# Patient Record
Sex: FEMALE | Race: WHITE | NOT HISPANIC OR LATINO | Employment: OTHER | URBAN - METROPOLITAN AREA
[De-identification: names, ages, dates, MRNs, and addresses within clinical notes are randomized per-mention and may not be internally consistent; named-entity substitution may affect disease eponyms.]

---

## 2017-03-29 ENCOUNTER — TELEPHONE (OUTPATIENT)
Dept: INTERNAL MEDICINE | Facility: CLINIC | Age: 52
End: 2017-03-29

## 2017-03-29 DIAGNOSIS — Z12.31 ENCOUNTER FOR SCREENING MAMMOGRAM FOR MALIGNANT NEOPLASM OF BREAST: Primary | ICD-10-CM

## 2017-03-29 NOTE — TELEPHONE ENCOUNTER
----- Message from Anni Armenta sent at 3/29/2017 10:30 AM CDT -----  Contact: Self/[phone]  Pt is requesting order be put in for jasvir.Please advise.

## 2017-09-17 ENCOUNTER — OFFICE VISIT (OUTPATIENT)
Dept: URGENT CARE | Facility: CLINIC | Age: 52
End: 2017-09-17
Payer: COMMERCIAL

## 2017-09-17 VITALS
DIASTOLIC BLOOD PRESSURE: 77 MMHG | WEIGHT: 125 LBS | HEART RATE: 73 BPM | HEIGHT: 66 IN | RESPIRATION RATE: 16 BRPM | SYSTOLIC BLOOD PRESSURE: 127 MMHG | TEMPERATURE: 98 F | OXYGEN SATURATION: 100 % | BODY MASS INDEX: 20.09 KG/M2

## 2017-09-17 DIAGNOSIS — S76.212A GROIN STRAIN, LEFT, INITIAL ENCOUNTER: Primary | ICD-10-CM

## 2017-09-17 PROCEDURE — 3008F BODY MASS INDEX DOCD: CPT | Mod: S$GLB,,, | Performed by: PHYSICIAN ASSISTANT

## 2017-09-17 PROCEDURE — 99203 OFFICE O/P NEW LOW 30 MIN: CPT | Mod: S$GLB,,, | Performed by: PHYSICIAN ASSISTANT

## 2017-09-17 RX ORDER — LORAZEPAM 1 MG/1
TABLET ORAL
COMMUNITY
Start: 2017-06-29 | End: 2017-09-27 | Stop reason: SDUPTHER

## 2017-09-17 RX ORDER — NAPROXEN SODIUM 220 MG/1
81 TABLET, FILM COATED ORAL
COMMUNITY
End: 2017-09-27

## 2017-09-17 RX ORDER — LORAZEPAM 0.5 MG/1
TABLET ORAL
COMMUNITY
End: 2017-09-27 | Stop reason: SDUPTHER

## 2017-09-17 RX ORDER — OMEGA-3 FATTY ACIDS 1000 MG
CAPSULE ORAL
COMMUNITY
End: 2018-01-25

## 2017-09-17 RX ORDER — ESTRADIOL 0.1 MG/G
CREAM VAGINAL
COMMUNITY
Start: 2017-07-06 | End: 2017-09-27 | Stop reason: SDUPTHER

## 2017-09-17 NOTE — PROGRESS NOTES
Subjective:       Patient ID: Willa Banegas is a 51 y.o. female.    Chief Complaint: Groin Pain    Pt states intermittent left groin pain x apprx 1 week after a pilates class.      Groin Pain   This is a new problem. The current episode started in the past 7 days. The problem occurs intermittently. The problem has been unchanged. The problem affects the left side. Pertinent negatives include no abdominal pain, back pain, chills, diarrhea, fever, headaches, joint pain, nausea, rash, sore throat or vomiting. The symptoms are aggravated by activity.     Review of Systems   Constitution: Negative for chills and fever.   HENT: Negative for sore throat.    Eyes: Negative for blurred vision.   Cardiovascular: Negative for chest pain.   Respiratory: Negative for shortness of breath.    Skin: Negative for rash.   Musculoskeletal: Negative for back pain and joint pain.   Gastrointestinal: Negative for abdominal pain, diarrhea, nausea and vomiting.   Neurological: Negative for headaches.   Psychiatric/Behavioral: The patient is not nervous/anxious.        Objective:      Physical Exam   Constitutional: She is oriented to person, place, and time. She appears well-developed and well-nourished. She is cooperative.  Non-toxic appearance. She does not appear ill. No distress.   HENT:   Head: Normocephalic and atraumatic.   Right Ear: Hearing, tympanic membrane, external ear and ear canal normal.   Left Ear: Hearing, tympanic membrane, external ear and ear canal normal.   Nose: Nose normal. No mucosal edema, rhinorrhea or nasal deformity. No epistaxis. Right sinus exhibits no maxillary sinus tenderness and no frontal sinus tenderness. Left sinus exhibits no maxillary sinus tenderness and no frontal sinus tenderness.   Mouth/Throat: Uvula is midline, oropharynx is clear and moist and mucous membranes are normal. No trismus in the jaw. Normal dentition. No uvula swelling. No posterior oropharyngeal erythema.   Eyes: Conjunctivae  and lids are normal. Right eye exhibits no discharge. Left eye exhibits no discharge. No scleral icterus.   Sclera clear bilat   Neck: Trachea normal, normal range of motion, full passive range of motion without pain and phonation normal. Neck supple.   Cardiovascular: Normal rate, regular rhythm, normal heart sounds, intact distal pulses and normal pulses.    Pulmonary/Chest: Effort normal and breath sounds normal. No respiratory distress.   Abdominal: Soft. Normal appearance and bowel sounds are normal. She exhibits no distension, no pulsatile midline mass and no mass. There is no hepatosplenomegaly. There is no tenderness. There is no rigidity, no rebound, no guarding, no CVA tenderness, no tenderness at McBurney's point and negative Youssef's sign. No hernia. Hernia confirmed negative in the ventral area, confirmed negative in the right inguinal area and confirmed negative in the left inguinal area.       Genitourinary: Vagina normal and uterus normal. Pelvic exam was performed with patient supine. No labial fusion. There is no rash, tenderness, lesion or injury on the right labia. There is no rash, tenderness, lesion or injury on the left labia. Cervix exhibits no motion tenderness, no discharge and no friability. Right adnexum displays no mass, no tenderness and no fullness. Left adnexum displays no mass, no tenderness and no fullness. No erythema, tenderness or bleeding in the vagina. No foreign body in the vagina. No signs of injury around the vagina. No vaginal discharge found.   Musculoskeletal: Normal range of motion. She exhibits no edema or deformity.        Left hip: She exhibits tenderness. She exhibits normal range of motion, normal strength, no bony tenderness, no swelling, no crepitus, no deformity and no laceration.        Left knee: Normal.        Left upper leg: Normal. She exhibits no tenderness, no bony tenderness, no swelling, no edema, no deformity and no laceration.        Legs:  Discomfort  with testing strength with adduction as well as mild discomfort  with abduction of L LE    No E/E/E   Lymphadenopathy:        Right: No inguinal adenopathy present.        Left: No inguinal adenopathy present.   Neurological: She is alert and oriented to person, place, and time. She exhibits normal muscle tone. Coordination normal.   Skin: Skin is warm, dry and intact. She is not diaphoretic. No pallor.   Psychiatric: She has a normal mood and affect. Her speech is normal and behavior is normal. Judgment and thought content normal. Cognition and memory are normal.   Nursing note and vitals reviewed.      Assessment:       1. Groin strain, left, initial encounter        Plan:       Willa was seen today for groin pain.    Diagnoses and all orders for this visit:    Groin strain, left, initial encounter      REST, ICE, AND NSAIDS  UNLIKELY OVARIAN ETIOLOGY. RECOMMENDED FURTHER EVALUATION WITH ULTRASOUND IF RED FLAGS/WARNINGS SIGNS PRESENT THEMSELVES.     Please follow up with your Primary care provider within 2-5 days if your signs ans symptoms have not resolved or worsen.     If your condition worsens or fails to improve we recommend that you receive another evaluation at the emergency room immediately or contact your primary medical clinic to discuss your concerns.   You must understand that you have received an Urgent Care treatment only and that you may be released before all of your medical problems are known or treated. You, the patient, will arrange for follow up care as instructed.

## 2017-09-17 NOTE — PATIENT INSTRUCTIONS
Groin Strain (Adult)     A groin strain is a stretching or partial tearing of the muscle in the lower abdomen or upper thigh. This may happen because of too much coughing, heavy lifting, or active sports. The pain may last for several days to weeks, depending on how bad the stretch or tear is. It will generally get better with rest, ice, and anti-inflammatory medicines.  A groin strain can lead to a groin hernia. This is also called an inguinal hernia. A hernia is a complete tear of the abdominal muscle. This allows fat or the intestines to bulge out and create a visible bump just above the thigh crease. This is a more serious problem and may need surgery to repair it. When you lie down, the bump should get smaller or disappear completely. If it doesnt, and you are not able to flatten it with your hand, you need medical attention right away.  Home care  · Avoid heavy lifting, straining, or any activities that cause groin pain.  · You may use over-the-counter pain medicine to control pain, unless another pain medicine was prescribed. If you have chronic liver or kidney disease or ever had a stomach ulcer or GI bleeding, talk with your healthcare provider before using these medicines.  Follow-up care  Follow up with your healthcare provider, or as advised. Make an appointment with your healthcare provider if you develop a bump in the area of the groin strain.  When to seek medical advice  Call your healthcare provider right away if any of these occur:  · Increasing pain in the area of the groin strain  · Tender bump just above the groin crease that does not flatten when you lie down or press on it  · Overall abdominal swelling or pain  · Fever of 100.4°F (38°C) or above lasting for 24 to 48 hours  · Repeated vomiting  · Pain that moves to the lower right abdomen, just below the waistline, or spreads to the back  Date Last Reviewed: 11/19/2015  © 6280-8097 [employer]. [address] [place] [address], [place] [address]. All rights reserved. This information is not intended as a substitute for professional medical care. Always follow your healthcare professional's instructions.      Please follow up with your Primary care provider within 2-5 days if your signs ans symptoms have not resolved or worsen.     If your condition worsens or fails to improve we recommend that you receive another evaluation at the emergency room immediately or contact your primary medical clinic to discuss your concerns.   You must understand that you have received an Urgent Care treatment only and that you may be released before all of your medical problems are known or treated. You, the patient, will arrange for follow up care as instructed.

## 2017-09-22 ENCOUNTER — TELEPHONE (OUTPATIENT)
Dept: INTERNAL MEDICINE | Facility: CLINIC | Age: 52
End: 2017-09-22

## 2017-09-22 ENCOUNTER — TELEPHONE (OUTPATIENT)
Dept: ORTHOPEDICS | Facility: CLINIC | Age: 52
End: 2017-09-22

## 2017-09-22 ENCOUNTER — TELEPHONE (OUTPATIENT)
Dept: OBSTETRICS AND GYNECOLOGY | Facility: CLINIC | Age: 52
End: 2017-09-22

## 2017-09-22 DIAGNOSIS — S76.219A GROIN STRAIN, UNSPECIFIED LATERALITY, INITIAL ENCOUNTER: Primary | ICD-10-CM

## 2017-09-22 NOTE — TELEPHONE ENCOUNTER
Hi,  I can see her Tuesday or urgent care here prior.  If she still wants to see ortho here is a referral.  Thank you, Dexter Cohen

## 2017-09-22 NOTE — TELEPHONE ENCOUNTER
----- Message from Cailin Prieto sent at 9/22/2017  2:58 PM CDT -----  Contact: self  _x  1st Request  _  2nd Request  _  3rd Request    Who:pt     Why: pt calling in regards to uti and pulled muscle.. Please advise    What Number to Call Back: [phone]    When to Expect a call back: (Before the end of the day)   -- if call after 3:00 call back will be tomorrow.

## 2017-09-22 NOTE — TELEPHONE ENCOUNTER
----- Message from Maite Hughes sent at 9/22/2017  2:24 PM CDT -----  Contact: Patient [phone]  Requesting a Referral    Requesting to see: Orthopedic    Reason for appt: Pulled muscle in groin    Please call and notify patient when referral has been placed.    Thank You

## 2017-09-22 NOTE — TELEPHONE ENCOUNTER
Pt notified.  She was seen at urgent care upw.  See notes.  She is still concerned about the pelvis pain, is unable to take the Motrin due to gastric distress. She continues to exercise and feels that maybe she is not letting the injury rest but is inquiring about options for possible therapy.  I encouraged her to contact the provider seen at the urgent care to discuss her needs re therapy.  She has an appt 10/2 in ortho.  She will call early next week if her condition worsens and did comment that she was told would take several weeks to feel improvement.

## 2017-09-22 NOTE — TELEPHONE ENCOUNTER
Spoke with patient whom stated she would prefer to be seen by ortho. Patient states she will call if symptoms get worse.

## 2017-09-22 NOTE — TELEPHONE ENCOUNTER
----- Message from Stella Ruiz sent at 9/22/2017  3:09 PM CDT -----  Contact: self  Patient states strain her pelvis want to be seen today. Went to urgent care on Sunday still in pain.

## 2017-09-22 NOTE — TELEPHONE ENCOUNTER
Pt has a appt with you on 11/1/17 is requesting a referral to Ortho for pulled muscle in groin. Please advise.

## 2017-09-25 ENCOUNTER — OFFICE VISIT (OUTPATIENT)
Dept: SPORTS MEDICINE | Facility: CLINIC | Age: 52
End: 2017-09-25
Payer: COMMERCIAL

## 2017-09-25 ENCOUNTER — HOSPITAL ENCOUNTER (OUTPATIENT)
Dept: RADIOLOGY | Facility: HOSPITAL | Age: 52
Discharge: HOME | End: 2017-09-25
Attending: ORTHOPAEDIC SURGERY
Payer: COMMERCIAL

## 2017-09-25 ENCOUNTER — PATIENT MESSAGE (OUTPATIENT)
Dept: OBSTETRICS AND GYNECOLOGY | Facility: CLINIC | Age: 52
End: 2017-09-25

## 2017-09-25 ENCOUNTER — HOSPITAL ENCOUNTER (OUTPATIENT)
Dept: RADIOLOGY | Facility: OTHER | Age: 52
Discharge: HOME | End: 2017-09-25
Attending: NURSE PRACTITIONER
Payer: COMMERCIAL

## 2017-09-25 ENCOUNTER — OFFICE VISIT (OUTPATIENT)
Dept: OBSTETRICS AND GYNECOLOGY | Facility: CLINIC | Age: 52
End: 2017-09-25
Payer: COMMERCIAL

## 2017-09-25 ENCOUNTER — TELEPHONE (OUTPATIENT)
Dept: OBSTETRICS AND GYNECOLOGY | Facility: CLINIC | Age: 52
End: 2017-09-25

## 2017-09-25 VITALS
SYSTOLIC BLOOD PRESSURE: 127 MMHG | BODY MASS INDEX: 20.09 KG/M2 | DIASTOLIC BLOOD PRESSURE: 66 MMHG | HEIGHT: 66 IN | HEART RATE: 83 BPM | WEIGHT: 125 LBS

## 2017-09-25 VITALS
DIASTOLIC BLOOD PRESSURE: 74 MMHG | BODY MASS INDEX: 19.3 KG/M2 | WEIGHT: 120.06 LBS | HEIGHT: 66 IN | SYSTOLIC BLOOD PRESSURE: 120 MMHG

## 2017-09-25 DIAGNOSIS — R10.2 PELVIC PAIN IN FEMALE: ICD-10-CM

## 2017-09-25 DIAGNOSIS — R10.32 LLQ PAIN: ICD-10-CM

## 2017-09-25 DIAGNOSIS — M25.552 LEFT HIP PAIN: ICD-10-CM

## 2017-09-25 DIAGNOSIS — M25.552 LEFT HIP PAIN: Primary | ICD-10-CM

## 2017-09-25 DIAGNOSIS — R10.2 PELVIC PAIN IN FEMALE: Primary | ICD-10-CM

## 2017-09-25 LAB
BILIRUB SERPL-MCNC: ABNORMAL MG/DL
BLOOD URINE, POC: ABNORMAL
C TRACH DNA SPEC QL NAA+PROBE: NOT DETECTED
CANDIDA RRNA VAG QL PROBE: POSITIVE
COLOR, POC UA: ABNORMAL
G VAGINALIS RRNA GENITAL QL PROBE: NEGATIVE
GLUCOSE UR QL STRIP: ABNORMAL
KETONES UR QL STRIP: ABNORMAL
LEUKOCYTE ESTERASE URINE, POC: ABNORMAL
N GONORRHOEA DNA SPEC QL NAA+PROBE: NOT DETECTED
NITRITE, POC UA: ABNORMAL
PH, POC UA: ABNORMAL
PROTEIN, POC: ABNORMAL
SPECIFIC GRAVITY, POC UA: ABNORMAL
T VAGINALIS RRNA GENITAL QL PROBE: NEGATIVE
UROBILINOGEN, POC UA: ABNORMAL

## 2017-09-25 PROCEDURE — 3008F BODY MASS INDEX DOCD: CPT | Mod: S$GLB,,, | Performed by: NURSE PRACTITIONER

## 2017-09-25 PROCEDURE — 87591 N.GONORRHOEAE DNA AMP PROB: CPT

## 2017-09-25 PROCEDURE — 76856 US EXAM PELVIC COMPLETE: CPT | Mod: TC

## 2017-09-25 PROCEDURE — 99213 OFFICE O/P EST LOW 20 MIN: CPT | Mod: 25,S$GLB,, | Performed by: NURSE PRACTITIONER

## 2017-09-25 PROCEDURE — 87480 CANDIDA DNA DIR PROBE: CPT

## 2017-09-25 PROCEDURE — 81002 URINALYSIS NONAUTO W/O SCOPE: CPT | Mod: S$GLB,,, | Performed by: NURSE PRACTITIONER

## 2017-09-25 PROCEDURE — 99204 OFFICE O/P NEW MOD 45 MIN: CPT | Mod: S$GLB,,, | Performed by: ORTHOPAEDIC SURGERY

## 2017-09-25 PROCEDURE — 73502 X-RAY EXAM HIP UNI 2-3 VIEWS: CPT | Mod: 26,LT,, | Performed by: RADIOLOGY

## 2017-09-25 PROCEDURE — 87660 TRICHOMONAS VAGIN DIR PROBE: CPT

## 2017-09-25 PROCEDURE — 76856 US EXAM PELVIC COMPLETE: CPT | Mod: 26,,, | Performed by: RADIOLOGY

## 2017-09-25 PROCEDURE — 87086 URINE CULTURE/COLONY COUNT: CPT

## 2017-09-25 PROCEDURE — 73502 X-RAY EXAM HIP UNI 2-3 VIEWS: CPT | Mod: TC,PO,LT

## 2017-09-25 PROCEDURE — 76830 TRANSVAGINAL US NON-OB: CPT | Mod: 26,,, | Performed by: RADIOLOGY

## 2017-09-25 PROCEDURE — 3008F BODY MASS INDEX DOCD: CPT | Mod: S$GLB,,, | Performed by: ORTHOPAEDIC SURGERY

## 2017-09-25 PROCEDURE — 99999 PR PBB SHADOW E&M-EST. PATIENT-LVL IV: CPT | Mod: PBBFAC,,, | Performed by: NURSE PRACTITIONER

## 2017-09-25 PROCEDURE — 99999 PR PBB SHADOW E&M-EST. PATIENT-LVL IV: CPT | Mod: PBBFAC,,, | Performed by: ORTHOPAEDIC SURGERY

## 2017-09-25 RX ORDER — CYCLOBENZAPRINE HCL 10 MG
10 TABLET ORAL 3 TIMES DAILY PRN
Qty: 30 TABLET | Refills: 0 | Status: SHIPPED | OUTPATIENT
Start: 2017-09-25 | End: 2017-10-05

## 2017-09-25 NOTE — PROGRESS NOTES
CC: Pelvic pain    Pt is 51 y.o. here for evaluation of pelvic pain. The pain is described as aching, and is 3/10 in intensity. Pain is located in the LLQ area with radiation to the left groin and left hip. Onset was sudden occurring 2 weeks ago. Symptoms have been gradually worsening since. Aggravating factors: eating. Alleviating factors: NSAIDs and ice, walking, lying down with leg uo. Associated symptoms: vaginal discomfort. The patient denies chills, diarrhea, dysuria, fever, frequency, headache, nausea and vomiting. Risk factors for pelvic/abdominal pain include uterine fibroids.      ROS:  GENERAL: Feeling well overall. Denies fever or chills.   SKIN: Denies rash or lesions.   HEAD: Denies head injury or headache.   NODES: Denies enlarged lymph nodes.   CHEST: Denies chest pain or shortness of breath.   CARDIOVASCULAR: Denies palpitations or left sided chest pain.   ABDOMEN: No abdominal pain, constipation, diarrhea, nausea, vomiting or rectal bleeding.   URINARY: No dysuria, hematuria, or burning on urination.  REPRODUCTIVE: See HPI.   BREASTS: Denies pain, lumps, or nipple discharge.   HEMATOLOGIC: No easy bruisability or excessive bleeding.   MUSCULOSKELETAL: Denies joint pain or swelling.   NEUROLOGIC: Denies syncope or weakness.   PSYCHIATRIC: Denies depression, anxiety or mood swings.    PE:   APPEARANCE: Well nourished, well developed, White female in no acute distress.  VULVA: No lesions. Normal external female genitalia.  URETHRAL MEATUS: Normal size and location, no lesions, no prolapse.  URETHRA: No masses, tenderness, or prolapse.  VAGINA: Moist. No lesions or lacerations noted. No abnormal discharge present. No odor present.   CERVIX: No lesions or discharge. No cervical motion tenderness.   UTERUS: Normal size, regular shape, mobile, non-tender.  ADNEXA: No tenderness. No fullness or masses palpated in the adnexal regions.   ANUS PERINEUM: Normal.        Diagnosis:  1. Pelvic pain in female         Plan:   GCCT   Affirm  Urine culture  Udip + for blood  Pelvic US  Discussed if GYN work up is negative to f/u with PCP for further evaluation      Orders Placed This Encounter    Urine culture    Vaginosis Screen by DNA Probe    US Pelvis Complete Non OB    POCT URINE DIPSTICK WITHOUT MICROSCOPE         Follow-up PRN no resolution of symptoms.    Johanna Lemus, ZANDRAP-C

## 2017-09-25 NOTE — PROGRESS NOTES
CC: LEFT hip pain    51 y.o. 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 In early august, they were in [place] and she had a bottle of champagne shatter on her L leg. Had to get 8 stitches. Was limping around the rest of the trip and aggravated her L hip. Approx. 2wk ago, running/jogging around the house (30mins) and she re-flared up the left hip but it resolved fairly quickly. She then did a pilates class and it flared up again. Went to urgent care 1 week ago 9/17), diagnosed with a groin strain. Has been taking it very easy the past week and is feeling much better. Overall feels 85% of her R side. Pain with sitting and and deep hip flexion. No history of hip pain, injections, physical therapy or surgery. She did have similar complaints last year. Work up per GYN to include U/S of ovaries which apparently was negative. No F/C/NS. No unexplained weight loss or illness. No issues with vaginal discharge/blood in urine or stool. No urinary complaints.     Dances, walking, jogging for exercise. Has not done this since last Sunday.     She states that the pain is significant and not responding adequately to conservative measures which have included activity modifications, rest, and oral medication. Is affecting ADLs and limiting desired level of activity.    Worse with prolonged and high impact activity activity on feet.     Better with rest.     Pain Score:   2    REVIEW OF SYSTEMS:   Constitution: Negative. Negative for chills, fever and night sweats.    Hematologic/Lymphatic: Negative for bleeding problem. Does not bruise/bleed easily.   Skin: Negative for dry skin, itching and rash.   Musculoskeletal: Negative for falls. Positive for right knee pain and  muscle weakness.     PAST MEDICAL HISTORY:   Past Medical History:   Diagnosis Date    Abnormal Pap smear     at age 19 mild dysplasia s/p cryotherapy    High cholesterol     History of colposcopy with cervical biopsy        PAST SURGICAL  HISTORY:   Past Surgical History:   Procedure Laterality Date    APPENDECTOMY      age 16     SECTION, CLASSIC      CRYOTHERAPY      for abnormal pap       FAMILY HISTORY:   Family History   Problem Relation Age of Onset    Breast cancer Paternal Grandmother      breast cancer (age 66)    Lung cancer Paternal Grandfather      smoker    Hypothyroidism Mother     Migraines Mother     Colonic polyp Mother     Stroke Maternal Grandmother      age 90    Hypertension Father     Colonic polyp Father     Colonic polyp Brother     Diabetes Neg Hx     Colon cancer Neg Hx      labor Neg Hx     Eclampsia Neg Hx     Miscarriages / Stillbirths Neg Hx     Ovarian cancer Neg Hx        SOCIAL HISTORY:   Social History     Social History    Marital status:      Spouse name: N/A    Number of children: N/A    Years of education: N/A     Occupational History    Not on file.     Social History Main Topics    Smoking status: Former Smoker     Packs/day: 0.50     Years: 7.00     Quit date: 1997    Smokeless tobacco: Never Used    Alcohol use Yes      Comment: Rare    Drug use: No    Sexual activity: Yes     Partners: Male     Other Topics Concern    Not on file     Social History Narrative    Works as a writer writing scripts and magazines. Goes back and forth between here and [place]. Has  and 2 kids (3 and 6 yo)       MEDICATIONS:     Current Outpatient Prescriptions:     acetaminophen (TYLENOL) 325 MG tablet, Take 325 mg by mouth every 6 (six) hours as needed for Pain., Disp: , Rfl:     alprazolam (XANAX) 0.25 MG tablet, Take 0.25 mg by mouth 3 (three) times daily., Disp: , Rfl:     aspirin 81 MG Chew, Take 81 mg by mouth., Disp: , Rfl:     busPIRone (BUSPAR) 15 MG tablet, Take 15 mg by mouth 3 (three) times daily., Disp: , Rfl:     coenzyme Q10 10 mg capsule, Take 10 mg by mouth once daily., Disp: , Rfl:     diphenhydrAMINE (BENADRYL) 12.5 mg/5 mL elixir, Take by mouth 4  "(four) times daily as needed for Allergies., Disp: , Rfl:     fish oil-omega-3 fatty acids 300-1,000 mg capsule, Take 2 g by mouth once daily., Disp: , Rfl:     lorazepam (ATIVAN) 1 MG tablet, , Disp: , Rfl:     omega-3 fatty acids 1,000 mg Cap, Take by mouth., Disp: , Rfl:     vitamin E 100 UNIT capsule, Take 100 Units by mouth once daily., Disp: , Rfl:     aspirin (ECOTRIN) 81 MG EC tablet, Take 81 mg by mouth once daily., Disp: , Rfl:     estradiol (ESTRACE) 0.01 % (0.1 mg/gram) vaginal cream, Place 1 g vaginally once daily., Disp: 42.5 g, Rfl: 1    estradiol (ESTRACE) 0.01 % (0.1 mg/gram) vaginal cream, Apply to external vulva nightly for 2weeks and then 2-3 times a week., Disp: , Rfl:     GILDESS 1/20, 21, 1-20 mg-mcg per tablet, TAKE 1 TABLET BY MOUTH ONCE DAILY., Disp: 21 tablet, Rfl: 11    lorazepam (ATIVAN) 0.5 MG tablet, Take 0.5 mg by mouth every 6 (six) hours as needed for Anxiety., Disp: , Rfl:     lorazepam (ATIVAN) 0.5 MG tablet, Take by mouth., Disp: , Rfl:     prenatal #115-iron-folic acid 29 mg iron- 1 mg Chew, Take by mouth once daily., Disp: , Rfl:     vitamin D 1000 units Tab, Take 185 mg by mouth once daily., Disp: , Rfl:     ALLERGIES:   Review of patient's allergies indicates:  No Known Allergies     PHYSICAL EXAMINATION:  /66   Pulse 83   Ht 5' 6" (1.676 m)   Wt 56.7 kg (125 lb)   BMI 20.18 kg/m²   General: Well-developed well-nourished 51 y.o. femalein no acute distress   Cardiovascular: Regular rhythm by palpation of distal pulse, normal color and temperature, no concerning varicosities on symptomatic side   Lungs: No labored breathing or wheezing appreciated   Neuro: Alert and oriented ×3   Psychiatric: well oriented to person, place and time, demonstrates normal mood and affect   Skin: No rashes, lesions or ulcers, normal temperature, turgor, and texture on involved extremity                Right Hip Exam   Right hip exam is normal.   Left Hip Exam     Inspection "   Swelling: absent  Bruising: absent    Tenderness   The patient tender to palpation of the psoas tendon.    Range of Motion   Extension: normal   Flexion: 120   Internal Rotation: 30   External Rotation: 70   Abduction: normal     Tests   Pain w/ forced internal rotation (KHALIF): present  Pain w/ forced external rotation (FADIR): present  Trendelenburg Test: negative  Circumduction test: negative  Stinchfield test: positive  Log Roll: negative  Snapping Hip (internal): negative    Other   Sensation: normal    Comments:  +Provocative maneuvers for true hip pain; also point tender over the LLQ abdomen; anterior groin pain with forced terminal flexion      Back (L-Spine & T-Spine) / Neck (C-Spine) Exam   Back exam is normal.    Back (L-Spine & T-Spine) Range of Motion   The patient has abnormal back ROM.      Muscle Strength   Left Lower Extremity   Hip Abduction: 5/5   Hip Adduction: 5/5   Hip Flexion: 5/5   Ankle Dorsiflexion:  5/5     Vascular Exam       Left Pulses  Dorsalis Pedis:      2+              IMAGING:    X-rays including standing, weight bearing AP and flexion bilateral knees, LEFT knee lateral and sunrise views ordered and images reviewed by me show:    No fracture or acute change, no AVN; mild early degenerative changes with sclerosis over superior sourcil, prominent AIIS, +crossover      ASSESSMENT:      ICD-10-CM ICD-9-CM   1. Left hip pain M25.552 719.45     Likely some component of EM with subspine impingement    PLAN:      Findings were reviewed with the patient.  Pain is actually been intermittent for more than a year now.  She localizes to the anterior groin with positive provocative maneuvers for true hip pain today.  I've recommended we obtain an MRA to assess the labrum and articular cartilage.  We'll inject a local anesthetic at the same time for diagnostic purposes.  Educated the patient regarding judging for potential therapeutic response.  Otherwise I recommended rest and oral  anti-inflammatory medication for the time being.  Return to clinic after study to discuss.      Procedures

## 2017-09-25 NOTE — TELEPHONE ENCOUNTER
Called to inform patient of normal US results, no answer, left message for patient to call office at [phone].

## 2017-09-25 NOTE — LETTER
September 25, 2017      Dexter Cohen MD  [address]  [facility] [address]           [facility]  [address]  [facility] [address]  Phone: [phone]          Patient: Willa Banegas   MR Number: [id number]   YOB: 1965   Date of Visit: 9/25/2017       Dear Dr. Dexter Cohen:    Thank you for referring Willa Banegas to me for evaluation. Attached you will find relevant portions of my assessment and plan of care.    If you have questions, please do not hesitate to call me. I look forward to following Willa Banegas along with you.    Sincerely,    W Stanislav Altamirano MD    Enclosure  CC:  No Recipients    If you would like to receive this communication electronically, please contact [email or web address] or [phone] to request more information on [facility] access.    For providers and/or their staff who would like to refer a patient to [facility], please contact us through our one-stop-shop provider referral line, [facility], at [phone].    If you feel you have received this communication in error or would no longer like to receive these types of communications, please e-mail [email or web address]

## 2017-09-25 NOTE — TELEPHONE ENCOUNTER
----- Message from Mariela Valle sent at 9/25/2017 12:55 PM CDT -----  Contact: WEI SALAZAR [[id number]]  _x  1st Request  _  2nd Request  _  3rd Request        Who: WEI SALAZAR [[id number]]    Why: patient states she has been having groin/pelvic pain for the past few weeks and would like to be seen as soon as possible. Patient would like a call back to schedule.      What Number to Call Back: [phone]    When to Expect a call back: (Before the end of the day)   -- if call after 3:00 call back will be tomorrow.

## 2017-09-26 ENCOUNTER — TELEPHONE (OUTPATIENT)
Dept: OBSTETRICS AND GYNECOLOGY | Facility: CLINIC | Age: 52
End: 2017-09-26

## 2017-09-26 ENCOUNTER — TELEPHONE (OUTPATIENT)
Dept: UROLOGY | Facility: CLINIC | Age: 52
End: 2017-09-26

## 2017-09-26 DIAGNOSIS — B37.31 VAGINAL YEAST INFECTION: Primary | ICD-10-CM

## 2017-09-26 RX ORDER — FLUCONAZOLE 150 MG/1
150 TABLET ORAL ONCE
Qty: 2 TABLET | Refills: 1 | Status: SHIPPED | OUTPATIENT
Start: 2017-09-26 | End: 2017-09-26

## 2017-09-26 NOTE — TELEPHONE ENCOUNTER
----- Message from Susan Reyes MA sent at 9/26/2017 10:01 AM CDT -----  Contact: Jayme Banegas/spouse [phone]  Please call and advise  ----- Message -----  From: Claritza Cramer  Sent: 9/26/2017   9:44 AM  To: [facility] THORNTON Staff    Jayme is needing a call back, regarding his wife's test results they received, they can be reached at [phone].

## 2017-09-26 NOTE — TELEPHONE ENCOUNTER
Called pt to discuss her concerns.  She is very concerned that her urine dip revealed large blood.  She is worried she may have bladder cancer.  Discussed urine culture is still pending.  Discussed that there are various causes of blood in the urine that are not malignant including exercise, UTI, and some mediations.  F/U consult with urology scheduled.

## 2017-09-26 NOTE — TELEPHONE ENCOUNTER
Patient notified of results and Rx sent to pharmacy. Patient was concern about blood in her urine. Informed patient that UC results are pending, will call her with results once we receive them. Patient verbalized understanding.         Please notify pt her vaginosis culture was positive for yeast.  Diflucan sent to the pharmacy.

## 2017-09-26 NOTE — TELEPHONE ENCOUNTER
----- Message from Mariela Valle sent at 9/26/2017  1:32 PM CDT -----  Contact: WEI SALAZAR [[id number]]  x_  1st Request  _  2nd Request  _  3rd Request        Who: WEI SALAZAR [[id number]]    Why: patient is returning a call     What Number to Call Back: [phone]    When to Expect a call back: (Before the end of the day)   -- if call after 3:00 call back will be tomorrow.

## 2017-09-26 NOTE — TELEPHONE ENCOUNTER
----- Message from Kris Lazo sent at 9/26/2017  1:03 PM CDT -----  Contact: pt  FST Request    x_ 1st Request   _ 2nd Request   _ 3rd Request     Who: WEI SALAZAR [[id number]]    Why: Pt called is requesting to be seen today for blood in urine.  Pt was diagnosed yesterday by Ms Lemus (see Chart Notes) and is currently scheduled to see Dr Johnson tomorrow.  Pt feels she needs to be seen today.    What Number to Call Back: [phone]    When to Expect a call back: (Before the end of the day)   -- if call after 3:00 call back will be tomorrow.

## 2017-09-27 ENCOUNTER — HOSPITAL ENCOUNTER (OUTPATIENT)
Dept: RADIOLOGY | Facility: OTHER | Age: 52
Discharge: HOME | End: 2017-09-27
Attending: UROLOGY
Payer: COMMERCIAL

## 2017-09-27 ENCOUNTER — TELEPHONE (OUTPATIENT)
Dept: UROLOGY | Facility: CLINIC | Age: 52
End: 2017-09-27

## 2017-09-27 ENCOUNTER — PATIENT MESSAGE (OUTPATIENT)
Dept: UROLOGY | Facility: CLINIC | Age: 52
End: 2017-09-27

## 2017-09-27 ENCOUNTER — OFFICE VISIT (OUTPATIENT)
Dept: UROLOGY | Facility: CLINIC | Age: 52
End: 2017-09-27
Attending: UROLOGY
Payer: COMMERCIAL

## 2017-09-27 ENCOUNTER — NURSE TRIAGE (OUTPATIENT)
Dept: ADMINISTRATIVE | Facility: CLINIC | Age: 52
End: 2017-09-27

## 2017-09-27 VITALS
SYSTOLIC BLOOD PRESSURE: 117 MMHG | HEIGHT: 66 IN | WEIGHT: 120 LBS | HEART RATE: 77 BPM | DIASTOLIC BLOOD PRESSURE: 72 MMHG | BODY MASS INDEX: 19.29 KG/M2

## 2017-09-27 DIAGNOSIS — R10.2 PELVIC PAIN IN FEMALE: ICD-10-CM

## 2017-09-27 DIAGNOSIS — R31.29 MICROHEMATURIA: ICD-10-CM

## 2017-09-27 DIAGNOSIS — R31.29 MICROHEMATURIA: Primary | ICD-10-CM

## 2017-09-27 LAB
BACTERIA UR CULT: NO GROWTH
BILIRUB SERPL-MCNC: ABNORMAL MG/DL
BLOOD URINE, POC: ABNORMAL
COLOR, POC UA: ABNORMAL
GLUCOSE UR QL STRIP: NORMAL
KETONES UR QL STRIP: ABNORMAL
LEUKOCYTE ESTERASE URINE, POC: ABNORMAL
MICROSCOPIC COMMENT: NORMAL
NITRITE, POC UA: ABNORMAL
PH, POC UA: 7
PROTEIN, POC: ABNORMAL
RBC #/AREA URNS AUTO: 1 /HPF (ref 0–4)
SPECIFIC GRAVITY, POC UA: 1
UROBILINOGEN, POC UA: NORMAL

## 2017-09-27 PROCEDURE — 3008F BODY MASS INDEX DOCD: CPT | Mod: S$GLB,,, | Performed by: UROLOGY

## 2017-09-27 PROCEDURE — 74178 CT ABD&PLV WO CNTR FLWD CNTR: CPT | Mod: 26,,, | Performed by: INTERNAL MEDICINE

## 2017-09-27 PROCEDURE — 81000 URINALYSIS NONAUTO W/SCOPE: CPT

## 2017-09-27 PROCEDURE — 25500020 PHARM REV CODE 255: Performed by: UROLOGY

## 2017-09-27 PROCEDURE — 81002 URINALYSIS NONAUTO W/O SCOPE: CPT | Mod: S$GLB,,, | Performed by: UROLOGY

## 2017-09-27 PROCEDURE — 74178 CT ABD&PLV WO CNTR FLWD CNTR: CPT | Mod: TC

## 2017-09-27 PROCEDURE — 99244 OFF/OP CNSLTJ NEW/EST MOD 40: CPT | Mod: 25,S$GLB,, | Performed by: UROLOGY

## 2017-09-27 RX ADMIN — IOHEXOL 125 ML: 350 INJECTION, SOLUTION INTRAVENOUS at 01:09

## 2017-09-27 NOTE — TELEPHONE ENCOUNTER
Lt message that magdalena left her a message to contact [facility]./key

## 2017-09-27 NOTE — TELEPHONE ENCOUNTER
Per my note to patient:  Your CT scan showed a small abnormality in the right ureter (tube that drains urine from kidney to bladder). I recommend that we change our plan for cystoscopy in the clinic. I would prefer to look at the bladder and now the ureter as well to see why the CT scan looks abnormal. This is something done in the OR, not in the clinic.     I would recommend that you see our nurse practitioner to arrange this procedure. I can do the procedure next week on Wednesday.     My staff will call to arrange your follow up appointment.         Please schedule appt with Karen to arrange for OR next week for cysto, b/l RPG, R ureteroscopy, possible biopsy.

## 2017-09-27 NOTE — PROGRESS NOTES
"  Subjective:       Willa Banegas is a 51 y.o. female who is a new patient who was referred by Mariah for evaluation of hematuria.      Hematuria  Patient complains of microscopic hematuria, never with gross hematuria. Onset of hematuria was 2 days ago and was sudden in onset. There is not a history of nephrolithiasis. There is not a history of urologic trauma. Other urologic symptoms include pelvic pain. Patient admits to history of no risk factors for cancer. Patient denies history of chronic Henry catheter,  surgeries, occupational exposure, sexually transmitted diseases, tobacco use, trauma and urolithiasis. Prior workup has been pelvic US by gyn. No family history of  malignancy/nephrolithiasis.    UA macro done 9/25/17 (two days ago) showed large blood. No UA micro for confirmation performed. Small blood noted on UA in 2010 in review of records.     She is being treated for yeast infection by gyn currently. Pelvic pain is improving with Diflucan. She also notes pulled hip flexor for which she saw ortho.      The following portions of the patient's history were reviewed and updated as appropriate: allergies, current medications, past family history, past medical history, past social history, past surgical history and problem list.    Review of Systems  Constitutional: no fever or chills  ENT: no nasal congestion or sore throat  Respiratory: no cough or shortness of breath  Cardiovascular: no chest pain or palpitations  Gastrointestinal: no nausea or vomiting, tolerating diet  Genitourinary: as per HPI  Hematologic/Lymphatic: no easy bruising or lymphadenopathy  Musculoskeletal: no arthralgias or myalgias  Skin: no rashes or lesions  Neurological: no seizures or tremors  Behavioral/Psych: no auditory or visual hallucinations       Objective:    Vitals: /72 (BP Location: Right arm, Patient Position: Sitting, BP Method: Large (Automatic))   Pulse 77   Ht 5' 6" (1.676 m)   Wt 54.4 kg (120 lb)  "  BMI 19.37 kg/m²     Physical Exam   General: well developed, well nourished in no acute distress  Head: normocephalic, atraumatic  Neck: supple, trachea midline, no obvious enlargement of thyroid  HEENT: EOMI, mucus membranes moist, sclera anicteric, no hearing impairment  Lungs: symmetric expansion, non-labored breathing  Cardiovascular: regular rate and rhythm, normal pulses  Abdomen: soft, non tender, non distended, no palpable masses, no hepatosplenomegaly, no hernias, no CVA tenderness  Musculoskeletal: no peripheral edema, normal ROM in bilateral upper and lower extremities  Lymphatics: no cervical or inguinal lymphadenopathy  Skin: no rashes or lesions  Neuro: alert and oriented x 3, no gross deficits  Psych: normal judgment and insight, normal mood/affect and non-anxious  Genitourinary:   patient declined exam      Lab Review   Urine analysis today in clinic shows positive for red blood cells 50    Lab Results   Component Value Date    WBC 6.52 02/24/2016    HGB 12.5 02/24/2016    HCT 38.4 02/24/2016    MCV 93 02/24/2016     02/24/2016     Lab Results   Component Value Date    CREATININE 0.8 02/24/2016    BUN 10 02/24/2016         Imaging  Images and reports were personally reviewed by me and discussed with patient  Pelvic US reviewed       Assessment/Plan:      1. Microhematuria    - Discussed etiology and workup of hematuria   - UA micro for confirmation   - UCx - pending from gyn   - Cytology - not indicated   - CT urogram   - Office cystoscopy     2. Pelvic pain in female    - Follow up with gyn re: yeast infection         Follow up in 1-2 weeks for cystoscopy

## 2017-09-27 NOTE — TELEPHONE ENCOUNTER
Pt called very upset would like  to call her today she doesn't want to speak to anyone but . I tried to explain about setting her procedure up in the hospital pt received ct scan results thur my chart an wants answers regarding the ct scan. Pt would like  to call her asap./key

## 2017-09-27 NOTE — TELEPHONE ENCOUNTER
Spoke with patient.  Explained to patient that Dr Johnson is out of the office.  I will have Dr Johnson call her 1st thing in the morning regarding CT results. Patient is upset because [facility] and does not under stand results.  Informed patient that I will make sure Dr Johnson call her in am

## 2017-09-27 NOTE — TELEPHONE ENCOUNTER
Patient calling for CT results.  LM for patient that Dr Johnson is gone for the day.  I will forward message to Dr Johnson and have her call patient in am.  Also LM for patient that urine culture from 9/25 is final and it is negative

## 2017-09-28 ENCOUNTER — TELEPHONE (OUTPATIENT)
Dept: UROLOGY | Facility: CLINIC | Age: 52
End: 2017-09-28

## 2017-09-28 NOTE — TELEPHONE ENCOUNTER
Spoke with pt and  at length via telephone (30 minutes). All questions, concerns, and scenarios addressed. Offered diagnostic ureteroscopy on 10/4/17 at [facility] [place] [facility]. She would need to see NP prior to sign consents and also have pre-op appt.    She will consider her options. She is considering going to [place] for second opinion.

## 2017-09-28 NOTE — TELEPHONE ENCOUNTER
Pt is freaking out on the phone wanting to speak with the MD about the notes she placed on her chart re guarding her CT results.     Please contact her at your earliest conveniece    Reason for Disposition   Nursing judgment    Protocols used: ST NO PROTOCOL CALL: INFORMATION ONLY-A-OH

## 2017-09-28 NOTE — TELEPHONE ENCOUNTER
----- Message from Lenard Lindsey sent at 9/28/2017  9:07 AM CDT -----  Contact: Patient   x_  1st Request  _  2nd Request  _  3rd Request        Who: WEI SALAZAR [[id number]]    Why: Requesting a call back in regards to discussing health concerns that she has which she would like explained to her.   Please return the call at earliest convenience. Thanks!    What Number to Call Back:[phone] (M)    When to Expect a call back: (Within 24 hours)

## 2017-09-29 ENCOUNTER — TELEPHONE (OUTPATIENT)
Dept: SPORTS MEDICINE | Facility: CLINIC | Age: 52
End: 2017-09-29

## 2017-09-29 ENCOUNTER — TELEPHONE (OUTPATIENT)
Dept: UROLOGY | Facility: CLINIC | Age: 52
End: 2017-09-29

## 2017-09-29 DIAGNOSIS — M25.552 LEFT HIP PAIN: Primary | ICD-10-CM

## 2017-09-29 NOTE — TELEPHONE ENCOUNTER
Spoke with patient.  She would like to have 2nd opinion with Dr Friend.  Offered patient appt on 10/3 at 8 am to see Dr Castañeda.  Patient states that she will be flying to [place] next week to see a urologist there.  She wanted Dr Castañeda to call her and give her advice over the phone.  Informed patient that she will need to be seen in office with Dr Castañeda to have him review her results.   Patient will go to [place] as planned

## 2017-09-29 NOTE — TELEPHONE ENCOUNTER
----- Message from Mariela Emanuel sent at 9/29/2017  8:49 AM CDT -----  Contact: WEI SALAZAR [[id number]]  _x  1st Request  _  2nd Request  _  3rd Request        Who: WEI SALAZAR [[id number]]    Why: Requesting a call back in regards to getting second opinion on test results. States Dr. Kaplan referred her to Doctor for second opinion. Please return the call at earliest convenience. Thanks!    What Number to Call Back: [phone]    When to Expect a call back: (Within 24 hours)

## 2017-09-29 NOTE — TELEPHONE ENCOUNTER
Spoke with Ms. Makenzie today. She believes her hip is feeling much better. At this time, she is more concerned with the urology tests that she received. She would like to cancel the MRI and stick to conservative treatment for her hip for now. I told her I would put in the referral for PT today. She is going out of town with no exact return date, so when she returns to the area she will schedule the appointments at the [facility]. I will message her on her patient portal the PT telephone number. EN.

## 2017-10-19 ENCOUNTER — OFFICE VISIT (OUTPATIENT)
Dept: OBSTETRICS AND GYNECOLOGY | Facility: CLINIC | Age: 52
End: 2017-10-19
Payer: COMMERCIAL

## 2017-10-19 ENCOUNTER — NURSE TRIAGE (OUTPATIENT)
Dept: ADMINISTRATIVE | Facility: CLINIC | Age: 52
End: 2017-10-19

## 2017-10-19 VITALS
HEIGHT: 66 IN | WEIGHT: 121 LBS | SYSTOLIC BLOOD PRESSURE: 126 MMHG | DIASTOLIC BLOOD PRESSURE: 68 MMHG | BODY MASS INDEX: 19.44 KG/M2

## 2017-10-19 DIAGNOSIS — R30.0 DYSURIA: Primary | ICD-10-CM

## 2017-10-19 DIAGNOSIS — R31.9 HEMATURIA, UNSPECIFIED TYPE: ICD-10-CM

## 2017-10-19 DIAGNOSIS — N89.8 VAGINAL DISCHARGE: ICD-10-CM

## 2017-10-19 LAB
BACTERIA #/AREA URNS AUTO: ABNORMAL /HPF
BILIRUB UR QL STRIP: NEGATIVE
CANDIDA RRNA VAG QL PROBE: NEGATIVE
CLARITY UR REFRACT.AUTO: CLEAR
COLOR UR AUTO: ABNORMAL
G VAGINALIS RRNA GENITAL QL PROBE: NEGATIVE
GLUCOSE UR QL STRIP: NEGATIVE
HGB UR QL STRIP: ABNORMAL
KETONES UR QL STRIP: NEGATIVE
LEUKOCYTE ESTERASE UR QL STRIP: NEGATIVE
MICROSCOPIC COMMENT: ABNORMAL
NITRITE UR QL STRIP: POSITIVE
NON-SQ EPI CELLS #/AREA URNS AUTO: <1 /HPF
PH UR STRIP: 7 [PH] (ref 5–8)
PROT UR QL STRIP: NEGATIVE
RBC #/AREA URNS AUTO: 5 /HPF (ref 0–4)
SP GR UR STRIP: 1.01 (ref 1–1.03)
SQUAMOUS #/AREA URNS AUTO: 0 /HPF
T VAGINALIS RRNA GENITAL QL PROBE: NEGATIVE
URN SPEC COLLECT METH UR: ABNORMAL
UROBILINOGEN UR STRIP-ACNC: 4 EU/DL
WBC #/AREA URNS AUTO: 0 /HPF (ref 0–5)

## 2017-10-19 PROCEDURE — 81001 URINALYSIS AUTO W/SCOPE: CPT

## 2017-10-19 PROCEDURE — 99213 OFFICE O/P EST LOW 20 MIN: CPT | Mod: S$GLB,,, | Performed by: NURSE PRACTITIONER

## 2017-10-19 PROCEDURE — 99999 PR PBB SHADOW E&M-EST. PATIENT-LVL IV: CPT | Mod: PBBFAC,,, | Performed by: NURSE PRACTITIONER

## 2017-10-19 PROCEDURE — 87086 URINE CULTURE/COLONY COUNT: CPT

## 2017-10-19 PROCEDURE — 87660 TRICHOMONAS VAGIN DIR PROBE: CPT

## 2017-10-19 PROCEDURE — 87480 CANDIDA DNA DIR PROBE: CPT

## 2017-10-19 RX ORDER — OXYCODONE AND ACETAMINOPHEN 5; 325 MG/1; MG/1
TABLET ORAL
COMMUNITY
Start: 2017-10-10 | End: 2017-11-10 | Stop reason: ALTCHOICE

## 2017-10-19 RX ORDER — PHENAZOPYRIDINE HYDROCHLORIDE 100 MG/1
TABLET, FILM COATED ORAL
COMMUNITY
Start: 2017-10-05 | End: 2017-11-10 | Stop reason: ALTCHOICE

## 2017-10-19 RX ORDER — CEPHALEXIN 500 MG/1
CAPSULE ORAL
COMMUNITY
Start: 2017-10-05 | End: 2017-11-10 | Stop reason: ALTCHOICE

## 2017-10-19 NOTE — PROGRESS NOTES
"HISTORY OF PRESENT ILLNESS:    Willa Banegas is a 51 y.o. female, , No LMP recorded. Patient is not currently having periods (Reason: Premenopausal).,  presents with c/o of either a yeast infection or UTI.  -Relates history of pulled groin muscle in September and pelvic US was normal for gyn etiologies of pain.  -So went to a urologist due to finding of microscopic blood in her urine.   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   -Reports stent was pulled last Thursday and she has pressure and a little burning at the end of urination since, although taking OTC Azo helps.   -Not sure if she has yeast because of vulvar irritation, but no vaginal discharge.   -Denies associated fever, back pain, pelvic pain, odor, urinary frequency, urgency, incontinence, nocturia, but still has blood in her urine.    Past Medical History:   Diagnosis Date    Abnormal Pap smear     at age 19 mild dysplasia s/p cryotherapy    Anxiety     High cholesterol     History of colposcopy with cervical biopsy        Past Surgical History:   Procedure Laterality Date    APPENDECTOMY      age 16     SECTION, CLASSIC      CRYOTHERAPY      for abnormal pap       MEDICATIONS AND ALLERGIES:      Current Outpatient Prescriptions:     acetaminophen (TYLENOL) 325 MG tablet, Take 325 mg by mouth every 6 (six) hours as needed for Pain., Disp: , Rfl:     alprazolam (XANAX) 0.25 MG tablet, Take 0.25 mg by mouth 3 (three) times daily., Disp: , Rfl:     aspirin (ECOTRIN) 81 MG EC tablet, Take 81 mg by mouth once daily., Disp: , Rfl:     busPIRone (BUSPAR) 15 MG tablet, Take 15 mg by mouth 3 (three) times daily., Disp: , Rfl:     cephALEXin (KEFLEX) 500 MG capsule, , Disp: , Rfl:     coenzyme Q10 10 mg capsule, Take 10 mg by mouth once daily., Disp: , Rfl:     diphenhydrAMINE (BENADRYL) 12.5 mg/5 mL elixir, Take by mouth 4 (four) times daily " as needed for Allergies., Disp: , Rfl:     estradiol (ESTRACE) 0.01 % (0.1 mg/gram) vaginal cream, Place 1 g vaginally once daily., Disp: 42.5 g, Rfl: 1    fish oil-omega-3 fatty acids 300-1,000 mg capsule, Take 2 g by mouth once daily., Disp: , Rfl:     lorazepam (ATIVAN) 0.5 MG tablet, Take 0.5 mg by mouth every 6 (six) hours as needed for Anxiety., Disp: , Rfl:     multivitamin capsule, Take 1 capsule by mouth once daily., Disp: , Rfl:     omega-3 fatty acids 1,000 mg Cap, Take by mouth., Disp: , Rfl:     oxycodone-acetaminophen (PERCOCET) 5-325 mg per tablet, , Disp: , Rfl:     phenazopyridine (PYRIDIUM) 100 MG tablet, , Disp: , Rfl:     vitamin E 100 UNIT capsule, Take 100 Units by mouth once daily., Disp: , Rfl:     Review of patient's allergies indicates:  No Known Allergies    Family History   Problem Relation Age of Onset    Breast cancer Paternal Grandmother      breast cancer (age 66)    Lung cancer Paternal Grandfather      smoker    Hypothyroidism Mother     Migraines Mother     Colonic polyp Mother     Stroke Maternal Grandmother      age 90    Hypertension Father     Colonic polyp Father     Colonic polyp Brother     Colon cancer Neg Hx     Ovarian cancer Neg Hx        Social History     Social History    Marital status:      Spouse name: N/A    Number of children: N/A    Years of education: N/A     Occupational History    Not on file.     Social History Main Topics    Smoking status: Former Smoker     Packs/day: 0.50     Years: 7.00     Quit date: 1/1/1997    Smokeless tobacco: Never Used    Alcohol use Yes      Comment: Rare    Drug use: No    Sexual activity: Yes     Partners: Male     Birth control/ protection: None     Other Topics Concern    Not on file     Social History Ita    Works as a writer writing scripts and magazines. Goes back and forth between here and [place]. Has  and 2 kids (3 and 6 yo)       OB HISTORY: Number of vaginal  "deliveries:2    COMPREHENSIVE GYN HISTORY:  PAP History: Reports abnormal Pap: age 19. Treatment: Colposcopy, Cryo. STATES HAD NEG PAP IN [place] 2017.  Infection History: Denies STDs. Denies PID.  Benign History: Denies uterine fibroids. Denies ovarian cysts. Denies endometriosis. Reports infertility.  Cancer History: Denies cervical cancer. Denies uterine cancer or hyperplasia. Denies ovarian cancer. Denies vulvar cancer or pre-cancer. Denies vaginal cancer or pre-cancer. Denies breast cancer. Denies colon cancer.  Sexual Activity History: Reports currently being sexually active  Menstrual History: Denies menses. Pt is  2014.    ROS:  GENERAL: No weight changes. No swelling. No fatigue. No fever.  CARDIOVASCULAR: No chest pain. No shortness of breath. No leg cramps.   NEUROLOGICAL: No headaches. No vision changes.  BREASTS: No pain. No lumps. No discharge.  ABDOMEN: + PRESSURE. No nausea. No vomiting. No diarrhea. No constipation.  REPRODUCTIVE: No abnormal bleeding.   VULVA: No pain. No lesions. + IRRITATION.  VAGINA: No relaxation. No itching. No odor. No discharge. No lesions.  URINARY: No incontinence. No nocturia. No frequency. + DYSURIA. + HEMATURIA.    /68   Ht 5' 6" (1.676 m)   Wt 54.9 kg (121 lb)   BMI 19.53 kg/m²     PE:  APPEARANCE: Well nourished, well developed, in no acute distress.  AFFECT: WNL, alert and oriented x 3.  ABDOMEN: Soft. No tenderness or masses. NO CVA TENDERNESS. PAIN NOT REPRODUCED ON EXAM.  PELVIC: External female genitalia without lesions.  Female hair distribution. Adequate perineal body, Normal urethral meatus. Vagina moist and well rugated without lesions. MUCOID D/C present.  No significant cystocele or rectocele present. Cervix pink without lesions, discharge or tenderness. Uterus is 8 week size, regular, mobile and nontender. Adnexa without masses or tenderness. PAIN NOT REPRODUCED ON EXAM.    PROCEDURES:  Urine dipstick - orange due to Azo    DIAGNOSIS:  1. " Dysuria    2. Hematuria, unspecified type    3. Vaginal discharge        PLAN:    Orders Placed This Encounter    Urine culture    Vaginosis Screen by DNA Probe    Urinalysis   Declined STD tests    COUNSELING:  The patient was counseled today on:  -that she should follow up with her Urologist due to hematuria, pressure and dysuria, but that likely this is expected due to recent stent removal;  -that I do not see evidence of vaginal infection, but will let her know tomorrow by My Chart the results of the vaginal culture and preliminary urine results.    FOLLOW-UP with me pending test results.

## 2017-10-20 ENCOUNTER — TELEPHONE (OUTPATIENT)
Dept: OBSTETRICS AND GYNECOLOGY | Facility: CLINIC | Age: 52
End: 2017-10-20

## 2017-10-20 ENCOUNTER — PATIENT MESSAGE (OUTPATIENT)
Dept: OBSTETRICS AND GYNECOLOGY | Facility: CLINIC | Age: 52
End: 2017-10-20

## 2017-10-20 DIAGNOSIS — N39.0 URINARY TRACT INFECTION WITHOUT HEMATURIA, SITE UNSPECIFIED: Primary | ICD-10-CM

## 2017-10-20 LAB — BACTERIA UR CULT: NORMAL

## 2017-10-20 RX ORDER — SULFAMETHOXAZOLE AND TRIMETHOPRIM 800; 160 MG/1; MG/1
1 TABLET ORAL 2 TIMES DAILY
Qty: 28 TABLET | Refills: 0 | Status: SHIPPED | OUTPATIENT
Start: 2017-10-20 | End: 2017-11-03

## 2017-10-20 NOTE — TELEPHONE ENCOUNTER
"This message came across today in regards to patient. I looked through OB notes:   went to a urologist due to finding of microscopic blood in her urine.   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   -Reports stent was pulled last Thursday and she has pressure and a little burning at the end of urination since, although taking OTC Azo helps.     Do you have any further advice for patient or should she follow up with provider in [place]? Please advise.   "

## 2017-10-20 NOTE — TELEPHONE ENCOUNTER
----- Message from Mariela Valle sent at 10/20/2017  8:03 AM CDT -----  Contact: WEI SALAZAR [[id number]]  _x  1st Request  _  2nd Request  _  3rd Request        Who: WEI SALAZAR [[id number]]    Why: patient states she would like a call back in regards to the messages she sent via [employer] this morning     What Number to Call Back: [phone]    When to Expect a call back: (Before the end of the day)   -- if call after 3:00 call back will be tomorrow.    PHONE CALL: Worried about urobilinogen in urine. Advised to repeat U/A and culture after completing the antibiotics as the AZO can cause false reactions. If still positive should see her PCP for further work up. Ingrid Lopez NP

## 2017-10-20 NOTE — TELEPHONE ENCOUNTER
Reason for Disposition   Caller requesting lab results    Protocols used:  PCP CALL - NO TRIAGE-A-AH    Willa's , Yeyo, called to question her lab results that came to her [facility].  He was concerned about the RBCs in urine, and he states she is anxious and nervous about the urobilinogen.  Explained he will need to obtain interpretation re: urobilinogen from the ordering provider, but tried to reassure that the slightly elevated  RBCs in urine is not unexpected with the stent placement and removal and the inflammation present due to this, as was explained by Ingrid Lopez today at the clinic visit with her.  Encouraged him to call Ingrid Lopez, or her urologist, tomorrow for additional explanation and interpretation of lab results.  Please contact caller directly with any additional care advice.

## 2017-10-23 ENCOUNTER — OFFICE VISIT (OUTPATIENT)
Dept: DERMATOLOGY | Facility: CLINIC | Age: 52
End: 2017-10-23
Payer: COMMERCIAL

## 2017-10-23 ENCOUNTER — PATIENT MESSAGE (OUTPATIENT)
Dept: DERMATOLOGY | Facility: CLINIC | Age: 52
End: 2017-10-23

## 2017-10-23 ENCOUNTER — PATIENT MESSAGE (OUTPATIENT)
Dept: OBSTETRICS AND GYNECOLOGY | Facility: CLINIC | Age: 52
End: 2017-10-23

## 2017-10-23 VITALS — BODY MASS INDEX: 19.53 KG/M2 | WEIGHT: 121 LBS

## 2017-10-23 DIAGNOSIS — S90.211A SUBUNGUAL HEMATOMA OF GREAT TOE OF RIGHT FOOT, INITIAL ENCOUNTER: Primary | ICD-10-CM

## 2017-10-23 PROCEDURE — 99999 PR PBB SHADOW E&M-EST. PATIENT-LVL II: CPT | Mod: PBBFAC,,, | Performed by: DERMATOLOGY

## 2017-10-23 PROCEDURE — 99201 PR OFFICE/OUTPT VISIT,NEW,LEVL I: CPT | Mod: S$GLB,,, | Performed by: DERMATOLOGY

## 2017-10-23 NOTE — Clinical Note
pls see note. Is there anyway you can confirm heme under the nail plate?? Pt is very nervous.  . Let  Me know, thanks, christina

## 2017-10-23 NOTE — PROGRESS NOTES
Subjective:       Patient ID:  Willa Banegas is a 51 y.o. female who presents for   Chief Complaint   Patient presents with    Nail Problem     left big toenail     Pt here for evaluation of dark color of  Left great toenail. Unsure how long it has been present.  Pt unsure of any recent trauma.  Pt just noticed the discoloration last pm .  She is very concerned it is melanoma.  Does not recall every having pigment on this toenail before. 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 She does state that she bleeds easily.       Nail Problem         Review of Systems   Constitutional: Positive for chills. Negative for fever and weight gain.        Recent UTI   Psychiatric/Behavioral: Positive for anxiety.   Hematologic/Lymphatic: Bruises/bleeds easily.        Objective:    Physical Exam   Constitutional: She appears well-developed and well-nourished. No distress.   Neurological: She is alert and oriented to person, place, and time. She is not disoriented.   Psychiatric: She has a normal mood and affect.   Skin:   Areas Examined (abnormalities noted in diagram):   Nails and Digits Inspection Performed             Diagram Legend     Erythematous scaling macule/papule c/w actinic keratosis       Vascular papule c/w angioma      Pigmented verrucoid papule/plaque c/w seborrheic keratosis      Yellow umbilicated papule c/w sebaceous hyperplasia      Irregularly shaped tan macule c/w lentigo     1-2 mm smooth white papules consistent with Milia      Movable subcutaneous cyst with punctum c/w epidermal inclusion cyst      Subcutaneous movable cyst c/w pilar cyst      Firm pink to brown papule c/w dermatofibroma      Pedunculated fleshy papule(s) c/w skin tag(s)      Evenly pigmented macule c/w junctional nevus     Mildly variegated pigmented, slightly irregular-bordered macule c/w mildly atypical nevus      Flesh colored to evenly pigmented papule c/w intradermal nevus        Pink pearly papule/plaque c/w basal cell carcinoma      Erythematous hyperkeratotic cursted plaque c/w SCC      Surgical scar with no sign of skin cancer recurrence      Open and closed comedones      Inflammatory papules and pustules      Verrucoid papule consistent consistent with wart     Erythematous eczematous patches and plaques     Dystrophic onycholytic nail with subungual debris c/w onychomycosis     Umbilicated papule    Erythematous-base heme-crusted tan verrucoid plaque consistent with inflamed seborrheic keratosis     Erythematous Silvery Scaling Plaque c/w Psoriasis     See annotation          Assessment / Plan:        Subungual hematoma of great toe of right foot, initial encounter  reassurance to pt that an acute onset of a dark nail is likely secondary to trauma, not melanoma.  I could not confirm this 100% for pt though.  Pt is very anxious about this area as she has had other recent medical issues.    I tried to take a punch  of nail to confirm subungual blood but was unsuccessful.  I see no changes around the nail folds which is reassuring  I will discuss with podiatry if there is anything confirmatory they can do to east pts mind.    We will monitor closely and photo taken today.            Return in about 4 weeks (around 11/20/2017).

## 2017-10-24 ENCOUNTER — TELEPHONE (OUTPATIENT)
Dept: DERMATOLOGY | Facility: CLINIC | Age: 52
End: 2017-10-24

## 2017-10-25 ENCOUNTER — PATIENT MESSAGE (OUTPATIENT)
Dept: DERMATOLOGY | Facility: CLINIC | Age: 52
End: 2017-10-25

## 2017-10-25 ENCOUNTER — TELEPHONE (OUTPATIENT)
Dept: PODIATRY | Facility: CLINIC | Age: 52
End: 2017-10-25

## 2017-10-25 NOTE — TELEPHONE ENCOUNTER
Call from patient concerning appts scheduled for her with podiatry.  She has seen different physician and will not need the appts.

## 2017-11-07 ENCOUNTER — PATIENT MESSAGE (OUTPATIENT)
Dept: OBSTETRICS AND GYNECOLOGY | Facility: CLINIC | Age: 52
End: 2017-11-07

## 2017-11-08 ENCOUNTER — TELEPHONE (OUTPATIENT)
Dept: OBSTETRICS AND GYNECOLOGY | Facility: CLINIC | Age: 52
End: 2017-11-08

## 2017-11-08 NOTE — TELEPHONE ENCOUNTER
----- Message from Ran Lacey sent at 11/7/2017  4:08 PM CST -----  PLEASE CALL PT SHE IS HAVING SOME  BREAST SORENESS  I SCHEDULE HER TO BE SEEN IN URGENT CARE DRE SAYS SHE NEEDS TO BE SEEN AT [facility] CAN YOU PUT ORDERS IN FOR [facility] IF ANY QUESTION PT CAN BE REACHED @ [phone]

## 2017-11-10 ENCOUNTER — TELEPHONE (OUTPATIENT)
Dept: SURGERY | Facility: CLINIC | Age: 52
End: 2017-11-10

## 2017-11-10 ENCOUNTER — TELEPHONE (OUTPATIENT)
Dept: OBSTETRICS AND GYNECOLOGY | Facility: CLINIC | Age: 52
End: 2017-11-10

## 2017-11-10 ENCOUNTER — OFFICE VISIT (OUTPATIENT)
Dept: URGENT CARE | Facility: CLINIC | Age: 52
End: 2017-11-10
Payer: COMMERCIAL

## 2017-11-10 VITALS
SYSTOLIC BLOOD PRESSURE: 134 MMHG | RESPIRATION RATE: 19 BRPM | DIASTOLIC BLOOD PRESSURE: 72 MMHG | TEMPERATURE: 99 F | HEART RATE: 73 BPM | OXYGEN SATURATION: 98 % | HEIGHT: 66 IN

## 2017-11-10 DIAGNOSIS — N63.0 BREAST MASS IN FEMALE: ICD-10-CM

## 2017-11-10 DIAGNOSIS — Z01.89 ENCOUNTER FOR URINE TEST: Primary | ICD-10-CM

## 2017-11-10 LAB
BILIRUB UR QL STRIP: NEGATIVE
GLUCOSE UR QL STRIP: NEGATIVE
KETONES UR QL STRIP: NEGATIVE
LEUKOCYTE ESTERASE UR QL STRIP: NEGATIVE
PH, POC UA: 6.5 (ref 5–8)
POC BLOOD, URINE: NEGATIVE
POC NITRATES, URINE: NEGATIVE
PROT UR QL STRIP: NEGATIVE
SP GR UR STRIP: 1 (ref 1–1.03)
UROBILINOGEN UR STRIP-ACNC: NORMAL (ref 0.1–1.1)

## 2017-11-10 PROCEDURE — 81003 URINALYSIS AUTO W/O SCOPE: CPT | Mod: QW,S$GLB,, | Performed by: EMERGENCY MEDICINE

## 2017-11-10 PROCEDURE — 99214 OFFICE O/P EST MOD 30 MIN: CPT | Mod: 25,S$GLB,, | Performed by: EMERGENCY MEDICINE

## 2017-11-10 NOTE — PATIENT INSTRUCTIONS
Your urine today was completely clean without evidence of blood or infection  I cannot definitely feel a breast mass. I think it may just be normal breast tissue. However you do have an appt with your doctor on Monday so keep that appt and they will get the US for you

## 2017-11-10 NOTE — TELEPHONE ENCOUNTER
----- Message from Chel Spencer sent at 11/10/2017 10:46 AM CST -----  _  1st Request  X_  2nd Request  _  3rd Request        Who: WEI SALAZAR [[id number]]    Why: Pt is returning a call from [employer]. She needs orders for targeted u/s at [facility]. Please call back.    What Number to Call Back:[phone]    When to Expect a call back: (Within 24 hours)    Please return the call at earliest convenience. Thanks!

## 2017-11-10 NOTE — TELEPHONE ENCOUNTER
----- Message from Jordin Ball sent at 11/10/2017  9:57 AM CST -----  Patient states that (s)he needs to speak with nurse in ref to scheduling an appt for today if possible;pt states that she has a lump in her left breast and Dr Hill was supposed to send over a referral with the information//please call back at [phone]//thank you

## 2017-11-10 NOTE — PROGRESS NOTES
"Subjective:       Patient ID: Willa Banegas is a 52 y.o. female.    Vitals:  height is 5' 6" (1.676 m). Her tympanic temperature is 98.8 °F (37.1 °C). Her blood pressure is 134/72 and her pulse is 73. Her respiration is 19 and oxygen saturation is 98%.     Chief Complaint: Breast Mass    Patient complaining of breast lump x 2-3 days. Patient reports hx of breast cysts. She has an appt scheduled on Monday 11/13/17 but was too anxious to wait. She admits that when she had one on the right she keep palpating it too much and actually made a bruise.  Patient also with a cystoscopy on October 2, 2017. Her stent was removed on 10/05/17. After procedure, patient developed a uti. Patient requesting a u/a to make sure her infection has cleared. She has no urinary sxs at this time  Pt is very anxious. She recognizes she is having panic attacks. Also her mom fell and she is going to see her next week  I reassured pt I don't feel any cyst at this time. However perhaps she can feel it better than my palpation. I have encouraged her to keep her appt with her PCP on Monday however and an US would be more definitive      Mass   This is a new problem. The current episode started in the past 7 days. The problem occurs constantly. The problem has been unchanged. Pertinent negatives include no abdominal pain, chest pain, chills, fever, headaches, nausea, rash, sore throat or vomiting. Nothing aggravates the symptoms. She has tried nothing for the symptoms.     Review of Systems   Constitution: Negative for chills and fever.   HENT: Negative for sore throat.    Eyes: Negative for blurred vision.   Cardiovascular: Negative for chest pain.   Respiratory: Negative for shortness of breath.    Skin: Negative for rash.   Musculoskeletal: Negative for back pain and joint pain.   Gastrointestinal: Negative for abdominal pain, diarrhea, nausea and vomiting.   Neurological: Negative for headaches.   Psychiatric/Behavioral: The patient is not " nervous/anxious.        Objective:      Physical Exam   Constitutional: She is oriented to person, place, and time. She appears well-developed and well-nourished. She is cooperative.  Non-toxic appearance. She does not appear ill. No distress.   HENT:   Head: Normocephalic and atraumatic.   Right Ear: Hearing, tympanic membrane, external ear and ear canal normal.   Left Ear: Hearing, tympanic membrane, external ear and ear canal normal.   Nose: Nose normal. No mucosal edema, rhinorrhea or nasal deformity. No epistaxis. Right sinus exhibits no maxillary sinus tenderness and no frontal sinus tenderness. Left sinus exhibits no maxillary sinus tenderness and no frontal sinus tenderness.   Mouth/Throat: Uvula is midline, oropharynx is clear and moist and mucous membranes are normal. No trismus in the jaw. Normal dentition. No uvula swelling. No posterior oropharyngeal erythema.   Eyes: Conjunctivae, EOM and lids are normal. Pupils are equal, round, and reactive to light. Right eye exhibits no discharge. Left eye exhibits no discharge. No scleral icterus.   Sclera clear bilat   Neck: Trachea normal, normal range of motion, full passive range of motion without pain and phonation normal. Neck supple.   Cardiovascular: Normal rate, regular rhythm, normal heart sounds, intact distal pulses and normal pulses.    Pulmonary/Chest: Effort normal and breath sounds normal. No respiratory distress.   Both breast palpated and no cyst felt. She is thin and ribs easily felt. No nipple drainage or change in skin. Pt has rubbed the left breast so much it is red where she has been rubbing   Abdominal: Soft. Normal appearance and bowel sounds are normal. She exhibits no distension, no pulsatile midline mass and no mass. There is no tenderness.   Musculoskeletal: Normal range of motion. She exhibits no edema or deformity.   Neurological: She is alert and oriented to person, place, and time. She exhibits normal muscle tone. Coordination  normal.   Skin: Skin is warm, dry and intact. She is not diaphoretic. No pallor.   Psychiatric: She has a normal mood and affect. Her speech is normal and behavior is normal. Judgment and thought content normal. Cognition and memory are normal.   Nursing note and vitals reviewed.      Office Visit on 11/10/2017   Component Date Value Ref Range Status    POC Blood, Urine 11/10/2017 Negative  Negative Final    POC Bilirubin, Urine 11/10/2017 Negative  Negative Final    POC Urobilinogen, Urine 11/10/2017 Normal  0.1 - 1.1 Final    POC Ketones, Urine 11/10/2017 Negative  Negative Final    POC Protein, Urine 11/10/2017 Negative  Negative Final    POC Nitrates, Urine 11/10/2017 Negative  Negative Final    POC Glucose, Urine 11/10/2017 Negative  Negative Final    pH, UA 11/10/2017 6.5  5 - 8 Final    POC Specific Gravity, Urine 11/10/2017 1.005  1.003 - 1.029 Final    POC Leukocytes, Urine 11/10/2017 Negative  Negative Final     Assessment:       1. Encounter for urine test    2. Breast mass in female        Plan:         Encounter for urine test  -     POCT Urinalysis, Dipstick, Automated, W/O Scope    Breast mass in female

## 2017-11-10 NOTE — TELEPHONE ENCOUNTER
Spoke with patient and informed her that Dr. Hill will be out of the until Tuesday. Ultrasound order will have to placed by her. Patient was informed that an appointment at the breast was available on Monday to be evaluated for breast lump. Appointments today were not available. Patient has no further questions or complaints.

## 2017-11-10 NOTE — TELEPHONE ENCOUNTER
Returned the patient's call regarding the message below.  Informed the patient that I could not get her in with a provider on today, but Monday I would be able to get her in.  The patient then asks if she could come in to get an ultrasound on today.  I informed the patient that her doctor () would need to put the order in for her to come in and have that done.  The patient then asked if I could call 's office regarding this matter.  Spoke with Blanco with , who stated that she will contact the patient regarding this matter.

## 2017-11-13 ENCOUNTER — IMMUNIZATION (OUTPATIENT)
Dept: INTERNAL MEDICINE | Facility: CLINIC | Age: 52
End: 2017-11-13
Payer: COMMERCIAL

## 2017-11-13 ENCOUNTER — OFFICE VISIT (OUTPATIENT)
Dept: SURGERY | Facility: CLINIC | Age: 52
End: 2017-11-13
Payer: COMMERCIAL

## 2017-11-13 ENCOUNTER — PATIENT MESSAGE (OUTPATIENT)
Dept: SURGERY | Facility: CLINIC | Age: 52
End: 2017-11-13

## 2017-11-13 VITALS
HEART RATE: 92 BPM | HEIGHT: 66 IN | DIASTOLIC BLOOD PRESSURE: 60 MMHG | BODY MASS INDEX: 19.3 KG/M2 | TEMPERATURE: 98 F | SYSTOLIC BLOOD PRESSURE: 122 MMHG | WEIGHT: 120.06 LBS

## 2017-11-13 DIAGNOSIS — N64.4 BREAST PAIN: Primary | ICD-10-CM

## 2017-11-13 PROCEDURE — 99999 PR PBB SHADOW E&M-EST. PATIENT-LVL III: CPT | Mod: PBBFAC,,, | Performed by: NURSE PRACTITIONER

## 2017-11-13 PROCEDURE — 90471 IMMUNIZATION ADMIN: CPT | Mod: S$GLB,,, | Performed by: INTERNAL MEDICINE

## 2017-11-13 PROCEDURE — 99212 OFFICE O/P EST SF 10 MIN: CPT | Mod: S$GLB,,, | Performed by: NURSE PRACTITIONER

## 2017-11-13 PROCEDURE — 90686 IIV4 VACC NO PRSV 0.5 ML IM: CPT | Mod: S$GLB,,, | Performed by: INTERNAL MEDICINE

## 2017-11-13 NOTE — LETTER
November 13, 2017      Willa Hill MD  [address]  [address]  [facility] [address]           [facility]  [address]  [facility] [address]  Phone: [phone]  Fax: [phone]          Patient: Willa Banegas   MR Number: [id number]   YOB: 1965   Date of Visit: 11/13/2017       Dear Dr. Willa Hill:    Thank you for referring Willa Banegas to me for evaluation. Attached you will find relevant portions of my assessment and plan of care.    If you have questions, please do not hesitate to call me. I look forward to following Willa Banegas along with you.    Sincerely,    Amanda Bergman, KARI-ZANDRAP    Enclosure  CC:  No Recipients    If you would like to receive this communication electronically, please contact [email or web address] or [phone] to request more information on [employer] access.    For providers and/or their staff who would like to refer a patient to [facility], please contact us through our one-stop-shop provider referral line, [facility], at [phone].    If you feel you have received this communication in error or would no longer like to receive these types of communications, please e-mail [email or web address]

## 2017-11-13 NOTE — PROGRESS NOTES
"Subjective:      Patient ID: Willa Banegas is a 52 y.o. female.    Chief Complaint: Breast Pain (Bilateral Breast) and Breast Mass (Left Breast)      HPI: (PF, EPF - 1-3) (Detailed, Comp, - 4) returning patient presents with c/o pain and lump right breast in area of previous cyst, "only started to become tender when I was digging around a lot". She reports no pain today "but I am very anxious and went to urgent care last week". Also reports feeling a lump in the left breast with associated intermittent tenderness. Denies skin changes, nipple discharge. Her and her  live in [place] and also in here in [place]. She reports increasing anxiety with onset of menopause "I just worry about everything when it comes to my health and I obsess about it"     Outside mmg and right US report from 2017 from [facility] with no abnormality or cyst reported     No previous breast surgery or biopsy    Menarche at 14   first at 41  Menopause at 52, estrogen topical vaginal cream    Review of Systems  Objective:   Physical Exam   Pulmonary/Chest: Right breast exhibits tenderness. Right breast exhibits no inverted nipple, no mass, no nipple discharge and no skin change. Left breast exhibits tenderness. Left breast exhibits no inverted nipple, no mass, no nipple discharge and no skin change. Breasts are symmetrical. There is no breast swelling.   Lymphadenopathy:     She has no cervical adenopathy.     She has no axillary adenopathy.        Right: No supraclavicular adenopathy present.        Left: No supraclavicular adenopathy present.     Assessment:       1. Breast pain        Plan:       No mass appreciated on exam today, patient states she can no longer feel the lump of her previous concern in the right breast and localized an area today in the left at 6 o'clock which is tender with no mass appreciated. She has mild diffuse intermittent breast tenderness associated with glandular tissue, no suspicious " breast mass on exam today. Reassurance provided. Discussed at great length s/s of breast cancer and BSE. I advised her to f/u with her GYN and PCP regarding anxiety and menopausal symptoms.   Return to me prn, she usually get her annual mmg in [place].

## 2017-11-17 ENCOUNTER — TELEPHONE (OUTPATIENT)
Dept: SURGERY | Facility: CLINIC | Age: 52
End: 2017-11-17

## 2017-11-17 NOTE — TELEPHONE ENCOUNTER
----- Message from Tabatha Keane sent at 11/17/2017 12:52 PM CST -----  [phone]//pt states that she needs to speak with nurse in ref to some f/u questions//please call/thank you

## 2017-11-21 ENCOUNTER — HOSPITAL ENCOUNTER (OUTPATIENT)
Dept: RADIOLOGY | Facility: HOSPITAL | Age: 52
Discharge: HOME | End: 2017-11-21
Attending: NURSE PRACTITIONER
Payer: COMMERCIAL

## 2017-11-21 ENCOUNTER — OFFICE VISIT (OUTPATIENT)
Dept: URGENT CARE | Facility: CLINIC | Age: 52
End: 2017-11-21
Payer: COMMERCIAL

## 2017-11-21 ENCOUNTER — OFFICE VISIT (OUTPATIENT)
Dept: SURGERY | Facility: CLINIC | Age: 52
End: 2017-11-21
Payer: COMMERCIAL

## 2017-11-21 ENCOUNTER — TELEPHONE (OUTPATIENT)
Dept: INTERNAL MEDICINE | Facility: CLINIC | Age: 52
End: 2017-11-21

## 2017-11-21 VITALS
HEIGHT: 66 IN | BODY MASS INDEX: 18.84 KG/M2 | TEMPERATURE: 98 F | HEART RATE: 86 BPM | SYSTOLIC BLOOD PRESSURE: 127 MMHG | DIASTOLIC BLOOD PRESSURE: 67 MMHG | WEIGHT: 117.19 LBS

## 2017-11-21 VITALS
RESPIRATION RATE: 16 BRPM | BODY MASS INDEX: 18.8 KG/M2 | HEIGHT: 66 IN | OXYGEN SATURATION: 96 % | TEMPERATURE: 98 F | SYSTOLIC BLOOD PRESSURE: 105 MMHG | WEIGHT: 117 LBS | HEART RATE: 81 BPM | DIASTOLIC BLOOD PRESSURE: 66 MMHG

## 2017-11-21 DIAGNOSIS — N63.0 BREAST MASS: Primary | ICD-10-CM

## 2017-11-21 DIAGNOSIS — J02.9 ACUTE PHARYNGITIS, UNSPECIFIED ETIOLOGY: Primary | ICD-10-CM

## 2017-11-21 DIAGNOSIS — N63.0 BREAST MASS: ICD-10-CM

## 2017-11-21 LAB
CTP QC/QA: YES
S PYO RRNA THROAT QL PROBE: NEGATIVE

## 2017-11-21 PROCEDURE — 77061 BREAST TOMOSYNTHESIS UNI: CPT | Mod: TC

## 2017-11-21 PROCEDURE — 77061 BREAST TOMOSYNTHESIS UNI: CPT | Mod: 26,,, | Performed by: RADIOLOGY

## 2017-11-21 PROCEDURE — 99214 OFFICE O/P EST MOD 30 MIN: CPT | Mod: S$GLB,,, | Performed by: PHYSICIAN ASSISTANT

## 2017-11-21 PROCEDURE — 76642 ULTRASOUND BREAST LIMITED: CPT | Mod: TC,RT

## 2017-11-21 PROCEDURE — 99999 PR PBB SHADOW E&M-EST. PATIENT-LVL III: CPT | Mod: PBBFAC,,, | Performed by: NURSE PRACTITIONER

## 2017-11-21 PROCEDURE — 77065 DX MAMMO INCL CAD UNI: CPT | Mod: 26,,, | Performed by: RADIOLOGY

## 2017-11-21 PROCEDURE — 99212 OFFICE O/P EST SF 10 MIN: CPT | Mod: S$GLB,,, | Performed by: NURSE PRACTITIONER

## 2017-11-21 PROCEDURE — 76642 ULTRASOUND BREAST LIMITED: CPT | Mod: 26,RT,, | Performed by: RADIOLOGY

## 2017-11-21 PROCEDURE — 87880 STREP A ASSAY W/OPTIC: CPT | Mod: QW,S$GLB,, | Performed by: PHYSICIAN ASSISTANT

## 2017-11-21 RX ORDER — LIDOCAINE HYDROCHLORIDE 20 MG/ML
SOLUTION OROPHARYNGEAL EVERY 4 HOURS
Qty: 100 ML | Refills: 0 | Status: SHIPPED | OUTPATIENT
Start: 2017-11-21 | End: 2018-01-25

## 2017-11-21 RX ORDER — CYCLOBENZAPRINE HCL 10 MG
10 TABLET ORAL 3 TIMES DAILY PRN
Refills: 0 | COMMUNITY
Start: 2017-09-25 | End: 2018-01-25

## 2017-11-21 RX ORDER — AZITHROMYCIN 250 MG/1
TABLET, FILM COATED ORAL
Qty: 6 TABLET | Refills: 0 | Status: SHIPPED | OUTPATIENT
Start: 2017-11-21 | End: 2018-01-25

## 2017-11-21 RX ORDER — SULFAMETHOXAZOLE AND TRIMETHOPRIM 800; 160 MG/1; MG/1
TABLET ORAL
COMMUNITY
Start: 2017-10-20 | End: 2018-01-25

## 2017-11-21 RX ORDER — FLUCONAZOLE 150 MG/1
TABLET ORAL
COMMUNITY
Start: 2017-09-26 | End: 2018-01-25

## 2017-11-21 NOTE — PROGRESS NOTES
"Subjective:       Patient ID: Willa Banegas is a 52 y.o. female.    Vitals:  height is 5' 6" (1.676 m) and weight is 53.1 kg (117 lb). Her temperature is 97.7 °F (36.5 °C). Her blood pressure is 105/66 and her pulse is 81. Her respiration is 16 and oxygen saturation is 96%.     Chief Complaint: Sore Throat    Sore Throat    This is a new problem. The current episode started yesterday. The problem has been unchanged. There has been no fever. Pertinent negatives include no abdominal pain, congestion, coughing, diarrhea, ear pain, headaches, hoarse voice, neck pain, shortness of breath or vomiting. She has tried NSAIDs for the symptoms. The treatment provided no relief.     Review of Systems   Constitution: Negative for chills, fever and malaise/fatigue.   HENT: Positive for sore throat. Negative for congestion, ear pain and hoarse voice.    Eyes: Negative for blurred vision, discharge, pain, redness and visual disturbance.   Cardiovascular: Negative for chest pain, dyspnea on exertion, leg swelling, near-syncope and syncope.   Respiratory: Negative for cough, shortness of breath, sputum production and wheezing.    Hematologic/Lymphatic: Negative for adenopathy.   Skin: Negative for itching and rash.   Musculoskeletal: Negative for back pain, myalgias, neck pain and stiffness.   Gastrointestinal: Negative for abdominal pain, diarrhea, nausea and vomiting.   Neurological: Negative for dizziness, headaches, light-headedness and numbness.   Psychiatric/Behavioral: Negative for altered mental status.   Allergic/Immunologic: Negative for hives.   All other systems reviewed and are negative.      Objective:      Physical Exam   Constitutional: She is oriented to person, place, and time. She appears well-developed and well-nourished.  Non-toxic appearance. She does not have a sickly appearance. She does not appear ill. No distress.   HENT:   Head: Normocephalic and atraumatic.   Right Ear: Hearing, tympanic membrane, " "external ear and ear canal normal.   Left Ear: Hearing, tympanic membrane, external ear and ear canal normal.   Nose: No mucosal edema. No epistaxis. Right sinus exhibits no maxillary sinus tenderness and no frontal sinus tenderness. Left sinus exhibits no maxillary sinus tenderness and no frontal sinus tenderness.   Mouth/Throat: Uvula is midline and mucous membranes are normal. No uvula swelling. Posterior oropharyngeal erythema present. No oropharyngeal exudate.   Bilateral clear nasal congestion and erythema    Eyes: Pupils are equal, round, and reactive to light.   Neck: Normal range of motion. Neck supple.   Cardiovascular: Normal rate, regular rhythm and normal heart sounds.  Exam reveals no gallop and no friction rub.    No murmur heard.  Pulmonary/Chest: Effort normal and breath sounds normal. No respiratory distress. She has no decreased breath sounds. She has no wheezes. She has no rhonchi. She has no rales.   Musculoskeletal: Normal range of motion.   Lymphadenopathy:        Head (right side): No submental, no submandibular, no preauricular, no posterior auricular and no occipital adenopathy present.        Head (left side): No submental, no submandibular, no preauricular, no posterior auricular and no occipital adenopathy present.     She has no cervical adenopathy.        Right cervical: No posterior cervical adenopathy present.       Left cervical: No posterior cervical adenopathy present.        Right: No supraclavicular adenopathy present.        Left: No supraclavicular adenopathy present.   Neurological: She is alert and oriented to person, place, and time. She is not disoriented. Coordination and gait normal.   Skin: No abrasion, no ecchymosis, no laceration and no rash noted. No erythema.   Psychiatric: She has a normal mood and affect. Her behavior is normal.   Nursing note and vitals reviewed.      /66   Pulse 81   Temp 97.7 °F (36.5 °C)   Resp 16   Ht 5' 6" (1.676 m)   Wt 53.1 kg " (117 lb)   SpO2 96%   BMI 18.88 kg/m²      Assessment:       1. Acute pharyngitis, unspecified etiology        Plan:         Acute pharyngitis, unspecified etiology  -     POCT rapid strep A  -     azithromycin (ZITHROMAX Z-JUANCARLOS) 250 MG tablet; Two tablets once on day one followed by one tablet daily for 5 days  Dispense: 6 tablet; Refill: 0  -     lidocaine HCl 2% (LIDOCAINE VISCOUS) 2 % Soln; by Mucous Membrane route every 4 (four) hours. Gargle and swallow 5ml  Dispense: 100 mL; Refill: 0    - Please return here or go to the Emergency Department for any concerns or worsening of condition.   - You have been prescribed antibiotics but your are instructed to withhold taking the antibiotics for the specified period of time discussed by the provider to see if your symptoms improve with other medications prescribed/suggested as your illness may be viral (viruses do not respond to antibiotics). If the antibiotics are started please take them to completion.    - Please follow up with your primary care provider (PCP) or discussed specialist(s) as needed.

## 2017-11-21 NOTE — PROGRESS NOTES
"Subjective:      Patient ID: Willa Banegas is a 52 y.o. female.    Chief Complaint: Axillary Swelling (Right Axilla) and Breast Cancer Screening (CBE)      HPI: (PF, EPF - 1-3) (Detailed, Comp, - 4) returning patient presents with continued concerns about intermittent tenderness right breast, states "it feels sore at the end of the day and I see some spots on the skin". She also reports concerns about "swelling" in the right axilla, states it has continued to become less noticeable but "I am still so worried ". Refer to previous clinic note for recent history 11-      Review of Systems  Objective:   Physical Exam   Pulmonary/Chest: She exhibits no mass, no tenderness, no laceration, no edema and no retraction. Right breast exhibits no inverted nipple, no nipple discharge, no skin change and no tenderness. Left breast exhibits no inverted nipple, no mass, no nipple discharge, no skin change and no tenderness. Breasts are symmetrical. There is no breast swelling.   In the right breast at 9 o'clock there appears to be glandular tissue, possible small cyst, with tenderness , measuring 4-6mm, no skin changes. Her skin changes of concern correlate with very small 1-2mm cherry angiomas on the right breast, she also has similar findings on her upper chest, abdomen and left cheek.    Lymphadenopathy:     She has no cervical adenopathy.     She has no axillary adenopathy.        Right: No supraclavicular adenopathy present.        Left: No supraclavicular adenopathy present.     Assessment:       1. Breast mass        Plan:       Right US and mmg today, no mass or abnormality seen,this correlates with lack of mass on exam, reassurance provided  Return prn      "

## 2017-11-21 NOTE — TELEPHONE ENCOUNTER
----- Message from Zaria Scott sent at 11/21/2017  1:04 PM CST -----  Contact: self/[phone]  Patient called in regards needing to find out if Dr Cohen can see her today patient want to be check for strep throat (unable to find an available appointment before thanksgiving). Please call and advise.       Thank you!!!

## 2017-11-22 NOTE — PATIENT INSTRUCTIONS
- Please return here or go to the Emergency Department for any concerns or worsening of condition.   - You have been prescribed antibiotics but your are instructed to withhold taking the antibiotics for the specified period of time discussed by the provider to see if your symptoms improve with other medications prescribed/suggested as your illness may be viral (viruses do not respond to antibiotics). If the antibiotics are started please take them to completion.    - Please follow up with your primary care provider (PCP) or discussed specialist(s) as needed.           Viral Pharyngitis (Sore Throat)    You (or your child, if your child is the patient) have pharyngitis (sore throat). This infection is caused by a virus. It can cause throat pain that is worse when swallowing, aching all over, headache, and fever. The infection may be spread by coughing, kissing, or touching others after touching your mouth or nose. Antibiotic medications do not work against viruses, so they are not used for treating this condition.  Home care  · If your symptoms are severe, rest at home. Return to work or school when you feel well enough.   · Drink plenty of fluids to avoid dehydration.  · For children: Use acetaminophen for fever, fussiness or discomfort. In infants over six months of age, you may use ibuprofen instead of acetaminophen. (NOTE: If your child has chronic liver or kidney disease or ever had a stomach ulcer or GI bleeding, talk with your doctor before using these medicines.) (NOTE: Aspirin should never be used in anyone under 18 years of age who is ill with a fever. It may cause severe liver damage.)   · For adults: You may use acetaminophen or ibuprofen to control pain or fever, unless another medicine was prescribed for this. (NOTE: If you have chronic liver or kidney disease or ever had a stomach ulcer or GI bleeding, talk with your doctor before using these medicines.)  · Throat lozenges or numbing throat sprays can  help reduce pain. Gargling with warm salt water will also help reduce throat pain. For this, dissolve 1/2 teaspoon of salt in 1 glass of warm water. To help soothe a sore throat, children can sip on juice or a popsicle. Children 5 years and older can also suck on a lollipop or hard candy.  · Avoid salty or spicy foods, which can be irritating to the throat.  Follow-up care  Follow up with your healthcare provider or our staff if you are not improving over the next week.  When to seek medical advice  Call your healthcare provider right away if any of these occur:  · Fever as directed by your doctor.  For children, seek care if:  ¨ Your child is of any age and has repeated fevers above 104°F (40°C).  ¨ Your child is younger than 2 years of age and has a fever of 100.4°F (38°C) that continues for more than 1 day.  ¨ Your child is 2 years old or older and has a fever of 100.4°F (38°C) that continues for more than 3 days.  · New or worsening ear pain, sinus pain, or headache  · Painful lumps in the back of neck  · Stiff neck  · Lymph nodes are getting larger  · Inability to swallow liquids, excessive drooling, or inability to open mouth wide due to throat pain  · Signs of dehydration (very dark urine or no urine, sunken eyes, dizziness)  · Trouble breathing or noisy breathing  · Muffled voice  · New rash  · Child appears to be getting sicker  Date Last Reviewed: 4/13/2015  © 1126-9178 [employer]. [address] [place] [address], [place] [address]. All rights reserved. This information is not intended as a substitute for professional medical care. Always follow your healthcare professional's instructions.

## 2018-01-25 ENCOUNTER — OFFICE VISIT (OUTPATIENT)
Dept: OBSTETRICS AND GYNECOLOGY | Facility: CLINIC | Age: 53
End: 2018-01-25
Payer: COMMERCIAL

## 2018-01-25 VITALS
WEIGHT: 124.75 LBS | SYSTOLIC BLOOD PRESSURE: 110 MMHG | HEIGHT: 66 IN | BODY MASS INDEX: 20.05 KG/M2 | DIASTOLIC BLOOD PRESSURE: 60 MMHG

## 2018-01-25 DIAGNOSIS — N76.0 VULVOVAGINITIS: Primary | ICD-10-CM

## 2018-01-25 LAB
CANDIDA RRNA VAG QL PROBE: NEGATIVE
G VAGINALIS RRNA GENITAL QL PROBE: NEGATIVE
T VAGINALIS RRNA GENITAL QL PROBE: NEGATIVE

## 2018-01-25 PROCEDURE — 87480 CANDIDA DNA DIR PROBE: CPT

## 2018-01-25 PROCEDURE — 99999 PR PBB SHADOW E&M-EST. PATIENT-LVL III: CPT | Mod: PBBFAC,,,

## 2018-01-25 PROCEDURE — 99213 OFFICE O/P EST LOW 20 MIN: CPT | Mod: S$GLB,,, | Performed by: OBSTETRICS & GYNECOLOGY

## 2018-01-25 RX ORDER — CITALOPRAM 10 MG/1
TABLET ORAL
COMMUNITY

## 2018-01-25 RX ORDER — FLUCONAZOLE 150 MG/1
150 TABLET ORAL ONCE
Qty: 1 TABLET | Refills: 1 | Status: SHIPPED | OUTPATIENT
Start: 2018-01-25 | End: 2018-01-25

## 2018-01-25 NOTE — PROGRESS NOTES
"Willa Banegas is a 52 y.o. female  presents with complaint of vulvovaginal itching for a few days now. Denies abnormal discharge or odor. Uses estrace cream and coconut oil occasionally. Admits to staying in her workout clothes for longer than she should.     Past Medical History:   Diagnosis Date    Abnormal Pap smear     at age 19 mild dysplasia s/p cryotherapy    Anxiety     Basal cell carcinoma     chin    High cholesterol     History of colposcopy with cervical biopsy      Past Surgical History:   Procedure Laterality Date    APPENDECTOMY      age 16     SECTION, CLASSIC      CRYOTHERAPY      for abnormal pap     Social History   Substance Use Topics    Smoking status: Former Smoker     Packs/day: 0.50     Years: 7.00     Quit date: 1997    Smokeless tobacco: Never Used    Alcohol use Yes      Comment: Rare     Family History   Problem Relation Age of Onset    Breast cancer Paternal Grandmother      breast cancer (age 66)    Lung cancer Paternal Grandfather      smoker    Hypothyroidism Mother     Migraines Mother     Colonic polyp Mother     Skin cancer Mother     Stroke Maternal Grandmother      age 90    Hypertension Father     Colonic polyp Father     Colonic polyp Brother     Colon cancer Neg Hx     Ovarian cancer Neg Hx      OB History    Para Term  AB Living   6 2     4 2   SAB TAB Ectopic Multiple Live Births   4              # Outcome Date GA Lbr Earl/2nd Weight Sex Delivery Anes PTL Lv   6 SAB            5 SAB            4 SAB            3 SAB            2 Para            1 Para                   /60   Ht 5' 6" (1.676 m)   Wt 56.6 kg (124 lb 12.5 oz)   BMI 20.14 kg/m²     ROS:  GENERAL: No fever, chills, fatigability or weight loss.  VULVAR: No pain, no lesions and + itching.  VAGINAL: No relaxation, no itching, no discharge, no abnormal bleeding and no lesions.  ABDOMEN: No abdominal pain. Denies nausea. Denies vomiting. No " diarrhea. No constipation  BREAST: Denies pain. No lumps. No discharge.  URINARY: No incontinence, no nocturia, no frequency and no dysuria.  CARDIOVASCULAR: No chest pain. No shortness of breath. No leg cramps.  NEUROLOGICAL: No headaches. No vision changes.    PHYSICAL EXAM:  VULVA: normal appearing vulva with no masses, tenderness or lesions   VAGINA: normal appearing vagina with normal color. Minimal white discharge, no lesions   CERVIX: normal appearing cervix without discharge or lesions   UTERUS: uterus is normal size, shape, consistency and nontender   ADNEXA: normal adnexa in size, nontender and no masses    ASSESSMENT and PLAN:    ICD-10-CM ICD-9-CM    1. Vulvovaginitis N76.0 616.10 Vaginosis Screen by DNA Probe      fluconazole (DIFLUCAN) 150 MG Tab     -rx diflucan today  -affirm pending    Patient was counseled today on vaginitis prevention including :  a. avoiding feminine products such as deoderant soaps, body wash, bubble bath, douches, scented toilet paper, deoderant tampons or pads, feminine wipes, chronic pad use, etc.  b. avoiding other vulvovaginal irritants such as long hot baths, humidity, tight, synthetic clothing, chlorine and sitting around in wet bathing suits  c. wearing cotton underwear, avoiding thong underwear and no underwear to bed  d. taking showers instead of baths and use a hair dryer on cool setting afterwards to dry  e. wearing cotton to exercise and shower immediately after exercise and change clothes  f. using polyurethane condoms without spermicide if sexually active and symptoms are triggered by intercourse    FOLLOW UP: PRN lack of improvement.

## 2018-01-26 ENCOUNTER — TELEPHONE (OUTPATIENT)
Dept: OBSTETRICS AND GYNECOLOGY | Facility: CLINIC | Age: 53
End: 2018-01-26

## 2018-01-26 NOTE — TELEPHONE ENCOUNTER
----- Message from Ashleigh Santos sent at 1/25/2018 12:23 PM CST -----  Contact: Patient   X _1st Request  _  2nd Request  _  3rd Request    Who:WEI SALAZAR [[id number]]    Why:Patient states she was given her test results and everything was negative she is requesting a call back to see what maybe causing the itching     What Number to Call Back:[phone]    When to Expect a call back: (Before the end of the day)   -- if call after 3:00 call back will be tomorrow.

## 2018-01-26 NOTE — TELEPHONE ENCOUNTER
Call patient ask if she had any question about her test results and itching. Pt stated she did not have no questions that she was good. Verbalized understanding.

## 2018-09-07 ENCOUNTER — TELEPHONE (OUTPATIENT)
Dept: INTERNAL MEDICINE | Facility: CLINIC | Age: 53
End: 2018-09-07

## 2018-09-07 DIAGNOSIS — Z00.00 ROUTINE GENERAL MEDICAL EXAMINATION AT A HEALTH CARE FACILITY: Primary | ICD-10-CM

## 2018-09-07 NOTE — TELEPHONE ENCOUNTER
Hi, here are the orders for lipids and mammo.  shingrix vaccine -- we are out of at [facility], she can ask at a pharmacy that gives vaccines, she should not need an order.Let me know if patient has any questions.  Thank you, Dexter Cohen

## 2018-09-07 NOTE — TELEPHONE ENCOUNTER
----- Message from Anni Armenta sent at 9/7/2018 11:07 AM CDT -----  Contact: Self/[phone]  Caller is requesting to schedule their annual screening mammogram appointment. Order is not listed in Epic.  Please enter order and contact patient to schedule.    Where would they like the mammogram performed?:  [facility]   Additional information:  Patient is also requesting a vaccine for shingles and order place to check cholesterol. Patient has had Annual with GP  in [place]

## 2018-09-19 DIAGNOSIS — Z12.39 SCREENING BREAST EXAMINATION: Primary | ICD-10-CM

## 2018-09-19 DIAGNOSIS — R92.30 BREAST DENSITY: ICD-10-CM

## 2018-10-01 ENCOUNTER — PATIENT MESSAGE (OUTPATIENT)
Dept: ADMINISTRATIVE | Facility: OTHER | Age: 53
End: 2018-10-01

## 2018-10-02 ENCOUNTER — IMMUNIZATION (OUTPATIENT)
Dept: INTERNAL MEDICINE | Facility: CLINIC | Age: 53
End: 2018-10-02
Payer: COMMERCIAL

## 2018-10-02 ENCOUNTER — HOSPITAL ENCOUNTER (OUTPATIENT)
Dept: RADIOLOGY | Facility: HOSPITAL | Age: 53
Discharge: HOME | End: 2018-10-02
Attending: OBSTETRICS & GYNECOLOGY
Payer: COMMERCIAL

## 2018-10-02 ENCOUNTER — TELEPHONE (OUTPATIENT)
Dept: INTERNAL MEDICINE | Facility: CLINIC | Age: 53
End: 2018-10-02

## 2018-10-02 DIAGNOSIS — Z12.39 SCREENING BREAST EXAMINATION: ICD-10-CM

## 2018-10-02 DIAGNOSIS — R92.30 BREAST DENSITY: ICD-10-CM

## 2018-10-02 PROCEDURE — 76642 ULTRASOUND BREAST LIMITED: CPT | Mod: 26,RT,, | Performed by: RADIOLOGY

## 2018-10-02 PROCEDURE — 76641 ULTRASOUND BREAST COMPLETE: CPT | Mod: TC,50,PO

## 2018-10-02 PROCEDURE — 76642 ULTRASOUND BREAST LIMITED: CPT | Mod: TC,50,PO

## 2018-10-02 PROCEDURE — 76642 ULTRASOUND BREAST LIMITED: CPT | Mod: 26,LT,, | Performed by: RADIOLOGY

## 2018-10-02 PROCEDURE — 77063 BREAST TOMOSYNTHESIS BI: CPT | Mod: TC,PO

## 2018-10-02 PROCEDURE — 77063 BREAST TOMOSYNTHESIS BI: CPT | Mod: 26,,, | Performed by: RADIOLOGY

## 2018-10-02 PROCEDURE — 77067 SCR MAMMO BI INCL CAD: CPT | Mod: 26,,, | Performed by: RADIOLOGY

## 2018-10-02 PROCEDURE — 90471 IMMUNIZATION ADMIN: CPT | Mod: S$GLB,,, | Performed by: INTERNAL MEDICINE

## 2018-10-02 PROCEDURE — 90686 IIV4 VACC NO PRSV 0.5 ML IM: CPT | Mod: S$GLB,,, | Performed by: INTERNAL MEDICINE

## 2018-10-03 NOTE — TELEPHONE ENCOUNTER
Hi, she can call her local pharmacy and try to get the shingrix shingles vaccine without any order from me.  Let me know if patient has any questions.  Thank you, Dexter Cohen

## 2019-03-11 ENCOUNTER — TELEPHONE (OUTPATIENT)
Dept: INTERNAL MEDICINE | Facility: CLINIC | Age: 54
End: 2019-03-11

## 2019-03-11 DIAGNOSIS — Z00.00 ROUTINE GENERAL MEDICAL EXAMINATION AT A HEALTH CARE FACILITY: Primary | ICD-10-CM

## 2019-03-11 NOTE — TELEPHONE ENCOUNTER
----- Message from Handy Suazo sent at 3/11/2019  4:53 PM CDT -----  Contact: Patient [phone]  Type: Orders Request    What orders/ testing are being requested? Urine test/Blood/ Cholesterol     Is there a future appointment scheduled for the patient with PCP? Yes    When? 03/22/19    Comments: patient is requesting blood work, stating is having back pain, would also like test for cholesterol, thyroid, and any others, contact patient for all labs that is requesting. Has follow up appt above date.    Please call an advise  Thank you

## 2019-03-12 NOTE — TELEPHONE ENCOUNTER
Hi, please contact the patient to assist in scheduling    Orders Placed This Encounter    CBC auto differential    Comprehensive metabolic panel    TSH    URINALYSIS    Lipid panel       Thank you, Dexter Cohen

## 2019-03-19 ENCOUNTER — OFFICE VISIT (OUTPATIENT)
Dept: INTERNAL MEDICINE | Facility: CLINIC | Age: 54
End: 2019-03-19
Payer: COMMERCIAL

## 2019-03-19 VITALS
WEIGHT: 130.94 LBS | SYSTOLIC BLOOD PRESSURE: 124 MMHG | BODY MASS INDEX: 21.04 KG/M2 | OXYGEN SATURATION: 99 % | HEART RATE: 80 BPM | HEIGHT: 66 IN | DIASTOLIC BLOOD PRESSURE: 66 MMHG

## 2019-03-19 DIAGNOSIS — Z00.00 ROUTINE GENERAL MEDICAL EXAMINATION AT A HEALTH CARE FACILITY: Primary | ICD-10-CM

## 2019-03-19 DIAGNOSIS — F41.9 ANXIETY: ICD-10-CM

## 2019-03-19 DIAGNOSIS — M54.50 ACUTE LOW BACK PAIN WITHOUT SCIATICA, UNSPECIFIED BACK PAIN LATERALITY: ICD-10-CM

## 2019-03-19 PROCEDURE — 99396 PREV VISIT EST AGE 40-64: CPT | Mod: S$GLB,,, | Performed by: INTERNAL MEDICINE

## 2019-03-19 PROCEDURE — 99999 PR PBB SHADOW E&M-EST. PATIENT-LVL IV: ICD-10-PCS | Mod: PBBFAC,,, | Performed by: INTERNAL MEDICINE

## 2019-03-19 PROCEDURE — 99999 PR PBB SHADOW E&M-EST. PATIENT-LVL IV: CPT | Mod: PBBFAC,,, | Performed by: INTERNAL MEDICINE

## 2019-03-19 PROCEDURE — 99396 PR PREVENTIVE VISIT,EST,40-64: ICD-10-PCS | Mod: S$GLB,,, | Performed by: INTERNAL MEDICINE

## 2019-03-19 NOTE — PATIENT INSTRUCTIONS
Back Exercises: Leg Reach        Do this exercise on your hands and knees. Keep your knees under your hips and your hands under your shoulders. Keep your spine in a neutral position (not arched or sagging). Be sure to maintain your necks natural curve.  · Extend one leg straight back. Dont arch your back or let your head or body sag.  · Hold for 5 seconds. Return to starting position.  · Repeat 5 times.  · Switch legs.   © 6886-3491 [employer]. [address] [place] [address]. All rights reserved. This information is not intended as a substitute for professional medical care. Always follow your healthcare professional's instructions.        Back Exercises: Lower Back Stretch                        To start, sit in a chair with your feet flat on the floor. Shift your weight slightly forward to avoid rounding your back. Relax, and keep your ears, shoulders, and hips aligned.  · Sit with your feet well apart.  · Bend forward and touch the floor with the backs of your hands. Relax and let your body drop.  · Hold for 20 seconds. Return to starting position.  · Repeat 2 times.   © 0036-2745 [employer]. [address] [place] [address]. All rights reserved. This information is not intended as a substitute for professional medical care. Always follow your healthcare professional's instructions.        Leg and Knee Exercises: Leg Raise    This exercise is designed to stretch and strengthen your knee. Before beginning, read through all the instructions. While exercising, breathe normally and use smooth movements. If you feel any pain, stop the exercise. If pain persists, call your health care provider.  CAUTION  · Dont arch your back.  · Dont hunch your shoulders.   · Sit on the floor with your _________ leg straight, the other bent.  · Tighten the thigh muscles on the top of your straight leg. You should feel the muscles contract. Raise that leg 6-8 inches. Then lower it  slowly and steadily to the floor. Relax.  · Repeat ______ times.  Do ______ sets a day.  © 1745-8605 [employer]. [address] [place] [address], [place] [address]. All rights reserved. This information is not intended as a substitute for professional medical care. Always follow your healthcare professional's instructions.

## 2019-03-19 NOTE — PROGRESS NOTES
Subjective:       Patient ID: Willa Banegas is a 53 y.o. female.    Chief Complaint: Low-back Pain (for about a week and a half, has taken Tylenol and Advil has helped some )    Here for annual.    1.5 yrs ago had microscopic bood in urine, which resolved. Had cysto and stent, no current urinary symptoms. No blood or dysuria. Plans to see [facility] urologist back in summer.    Recent . Working on improving her diet.    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    Increased anxiety, gets benzos from psychiatrist in [facility]. Not in current psychotx. Does drink but on occasion.      Back Pain   This is a new problem. The current episode started 1 to 4 weeks ago. The problem occurs daily. The problem has been waxing and waning since onset. The pain is present in the lumbar spine. The quality of the pain is described as aching. The pain is at a severity of 2/10. The pain is mild. The pain is the same all the time. The symptoms are aggravated by position. Stiffness is present all day. Pertinent negatives include no abdominal pain, bladder incontinence, bowel incontinence, chest pain, dysuria, fever, headaches, leg pain, numbness, paresis, paresthesias, pelvic pain, perianal numbness, tingling, weakness or weight loss. Risk factors include menopause and pregnancy. She has tried bed rest and walking for the symptoms. The treatment provided mild relief.     Review of Systems   Constitutional: Negative for fever and weight loss.   Cardiovascular: Negative for chest pain.   Gastrointestinal: Negative for abdominal pain and bowel incontinence.   Genitourinary: Negative for bladder incontinence, dysuria, hematuria and pelvic pain.   Musculoskeletal: Positive for back pain.   Neurological: Negative for tingling, weakness, numbness, headaches and paresthesias.   Psychiatric/Behavioral: The patient is nervous/anxious.        Objective:      Physical Exam   Constitutional: She  is oriented to person, place, and time. She appears well-developed and well-nourished. No distress.   HENT:   Head: Normocephalic and atraumatic.   Eyes: Pupils are equal, round, and reactive to light. No scleral icterus.   Neck: Normal range of motion. No thyromegaly present.   Cardiovascular: Normal rate, regular rhythm and normal heart sounds. Exam reveals no gallop and no friction rub.   No murmur heard.  Pulmonary/Chest: Effort normal and breath sounds normal. No respiratory distress. She has no wheezes. She has no rales.   Abdominal: Soft. Bowel sounds are normal. She exhibits no distension and no mass. There is no tenderness. There is no rebound and no guarding.   Musculoskeletal: Normal range of motion. She exhibits no edema or tenderness.   nl lower extrem strength/sense/DTRs    No midline spine tenderness to deep palpation    Benign feeling dermal based nodule R side mid back, well demarcated, c/w lipoma   Lymphadenopathy:     She has no cervical adenopathy.   Neurological: She is alert and oriented to person, place, and time.   Skin: She is not diaphoretic.   Psychiatric: She has a normal mood and affect. Her speech is normal and behavior is normal. Cognition and memory are normal.   anxious       Assessment:       1. Anxiety    2. Routine general medical examination at a health care facility    3. Acute low back pain without sciatica, unspecified back pain laterality        Plan:       Willa was seen today for low-back pain.    Diagnoses and all orders for this visit:    Anxiety  -     Ambulatory Referral to Psychology  Lengthy discussion re decreased benzo use, yoga/meditation.    Routine general medical examination at a health care facility  -     Lipid panel; Future  -     TSH; Future  Has had v high LDL, she would like fasting recheck with improved diet    Acute low back pain without sciatica, unspecified back pain laterality  -     Ambulatory consult to Physical Therapy  Pt given  handouts.  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    Future Appointments   Date Time Provider Department Center   4/2/2019 11:00 AM Liliya Simon, PT [facility] [place]   4/15/2019  9:00 AM LAB, SAME DAY [facility]

## 2019-04-02 ENCOUNTER — DOCUMENTATION ONLY (OUTPATIENT)
Dept: REHABILITATION | Facility: OTHER | Age: 54
End: 2019-04-02

## 2019-04-02 NOTE — PROGRESS NOTES
Physical Therapy No Show Note       Patient was scheduled for physical therapy initial evaluation at [facility] for 4/2/2019. Pt failed to appear for appointment without prior notification for today*.     Per clinic policy, pt has been removed from schedule due to no shows. Pt will need to contact the clinic for future appointments.        Liliya Simon, PT

## 2019-04-10 ENCOUNTER — PATIENT MESSAGE (OUTPATIENT)
Dept: OBSTETRICS AND GYNECOLOGY | Facility: CLINIC | Age: 54
End: 2019-04-10

## 2019-04-10 DIAGNOSIS — N89.8 VAGINAL ITCHING: Primary | ICD-10-CM

## 2019-04-10 RX ORDER — FLUCONAZOLE 200 MG/1
200 TABLET ORAL EVERY OTHER DAY
Qty: 2 TABLET | Refills: 0 | Status: SHIPPED | OUTPATIENT
Start: 2019-04-10 | End: 2019-04-13

## 2019-10-25 ENCOUNTER — TELEPHONE (OUTPATIENT)
Dept: INTERNAL MEDICINE | Facility: CLINIC | Age: 54
End: 2019-10-25

## 2019-10-25 DIAGNOSIS — Z12.31 ENCOUNTER FOR SCREENING MAMMOGRAM FOR MALIGNANT NEOPLASM OF BREAST: Primary | ICD-10-CM

## 2019-10-25 NOTE — TELEPHONE ENCOUNTER
----- Message from Amber Robles sent at 10/25/2019  3:06 PM CDT -----  Contact: Patient [phone]  Patient is asking that an order be placed for mammo and ultra sound.  Request call back.    Please call and advise  Thank you

## 2019-10-28 ENCOUNTER — TELEPHONE (OUTPATIENT)
Dept: INTERNAL MEDICINE | Facility: CLINIC | Age: 54
End: 2019-10-28

## 2019-10-28 DIAGNOSIS — Z12.31 ENCOUNTER FOR SCREENING MAMMOGRAM FOR MALIGNANT NEOPLASM OF BREAST: Primary | ICD-10-CM

## 2019-10-28 NOTE — TELEPHONE ENCOUNTER
Hi, please contact the patient to assist in scheduling    Orders Placed This Encounter    Mammo Digital Screening Bilat w/ Sea    US BREAST SCREENING BILATERAL       Thank you, Dexter Cohen

## 2019-10-28 NOTE — TELEPHONE ENCOUNTER
Hi, please call her -- last year when she had a mammogram the radiologist just recommended a mammogram. The last 2 ultrasounds were normal.    I recommend the 3 d mammogram on its own.    Please assist in scheduling    Orders Placed This Encounter    Mammo Digital Screening Bilat w/ Sea       Let me know if patient has any questions.  Thank you, Dexter Cohen

## 2019-10-28 NOTE — TELEPHONE ENCOUNTER
----- Message from Janneth Villatoro sent at 10/28/2019  1:38 PM CDT -----  Contact: [phone]  Patient needs a diagnostic Mammo with an ultra sound order at [facility] not the regular Mammo due to her breast density to

## 2019-10-29 ENCOUNTER — TELEPHONE (OUTPATIENT)
Dept: INTERNAL MEDICINE | Facility: CLINIC | Age: 54
End: 2019-10-29

## 2019-10-29 ENCOUNTER — TELEPHONE (OUTPATIENT)
Dept: SURGERY | Facility: CLINIC | Age: 54
End: 2019-10-29

## 2019-10-29 ENCOUNTER — TELEPHONE (OUTPATIENT)
Dept: RADIOLOGY | Facility: HOSPITAL | Age: 54
End: 2019-10-29

## 2019-10-29 ENCOUNTER — PATIENT MESSAGE (OUTPATIENT)
Dept: SURGERY | Facility: CLINIC | Age: 54
End: 2019-10-29

## 2019-10-29 DIAGNOSIS — R92.30 DENSE BREAST TISSUE ON MAMMOGRAM: ICD-10-CM

## 2019-10-29 DIAGNOSIS — Z12.31 ENCOUNTER FOR SCREENING MAMMOGRAM FOR MALIGNANT NEOPLASM OF BREAST: Primary | ICD-10-CM

## 2019-10-29 NOTE — TELEPHONE ENCOUNTER
Hi, I am emailing Abner Thibodeaux from [facility] to see how we can have it scheduled there.  Please let the patient know we are working on this.  Thank you, Dexter Cohen

## 2019-10-29 NOTE — TELEPHONE ENCOUNTER
Called pt she states that she is uncomfortable going to the imaging center because she doesn't feel that she was treated correctly. She also states that she doesn't want to go anywhere else other than [facility] and if she cant go there then she would have to go all the way to [place] and she doesn't feel that this is fair. Pt declined all appts to imaging center.

## 2019-10-29 NOTE — TELEPHONE ENCOUNTER
Contacted the patient regarding message sent to have mammogram scheduled at [facility].  Explained to the patient that [facility] only does diagnostic mammogram and screening mammograms are done at the outpatient imaging center.  The patient voiced understanding of this information.  She stated she will contact her doctors office back to have them change the order.

## 2019-10-29 NOTE — TELEPHONE ENCOUNTER
----- Message from Karen Prescott sent at 10/29/2019 12:18 PM CDT -----  Contact: PT  PT is requesting that her mammogram and ultrasound to be scheduled at [facility] together  Advised PT isn't only allowing me to schedule the mammo at the imaging center and the ultrasound at [facility]    PT is requesting the mammogram order to be switched to be at [facility] if possible please    Callback: [phone]

## 2019-10-29 NOTE — TELEPHONE ENCOUNTER
Hi here are the orders --  please assist in scheduling    Orders Placed This Encounter    Mammo Digital Diagnostic Bilat w/ Sea    US Breast Bilateral Complete       Thank you, Dexter Cohen

## 2019-10-29 NOTE — TELEPHONE ENCOUNTER
----- Message from Aria Banerjee sent at 10/29/2019  3:53 PM CDT -----  Contact: Patient [phone]  Patient mammograms orders need to be changed to diagnostic mammogram and breast ultrasound.    Please call and advise.    Thank You

## 2019-10-29 NOTE — TELEPHONE ENCOUNTER
Patient returned my call, scheduled diagnostic mammogram and breast ultrasound for Tuesday 11/5/2019. Per patient request.

## 2019-11-05 ENCOUNTER — HOSPITAL ENCOUNTER (OUTPATIENT)
Dept: RADIOLOGY | Facility: HOSPITAL | Age: 54
Discharge: HOME | End: 2019-11-05
Attending: INTERNAL MEDICINE
Payer: COMMERCIAL

## 2019-11-05 VITALS — BODY MASS INDEX: 21.04 KG/M2 | HEIGHT: 66 IN | WEIGHT: 130.94 LBS

## 2019-11-05 DIAGNOSIS — Z12.31 ENCOUNTER FOR SCREENING MAMMOGRAM FOR MALIGNANT NEOPLASM OF BREAST: ICD-10-CM

## 2019-11-05 DIAGNOSIS — R92.30 DENSE BREAST TISSUE ON MAMMOGRAM: ICD-10-CM

## 2019-11-05 PROCEDURE — 76642 PR US BREAST UNILAT LIMITED: ICD-10-PCS | Mod: 26,LT,, | Performed by: RADIOLOGY

## 2019-11-05 PROCEDURE — 77066 MAMMO DIGITAL DIAGNOSTIC BILAT WITH TOMOSYNTHESIS_CAD: ICD-10-PCS | Mod: 26,,, | Performed by: RADIOLOGY

## 2019-11-05 PROCEDURE — 76642 ULTRASOUND BREAST LIMITED: CPT | Mod: 26,RT,, | Performed by: RADIOLOGY

## 2019-11-05 PROCEDURE — 77062 BREAST TOMOSYNTHESIS BI: CPT | Mod: 26,,, | Performed by: RADIOLOGY

## 2019-11-05 PROCEDURE — 76642 ULTRASOUND BREAST LIMITED: CPT | Mod: TC,50,PO

## 2019-11-05 PROCEDURE — 77066 DX MAMMO INCL CAD BI: CPT | Mod: 26,,, | Performed by: RADIOLOGY

## 2019-11-05 PROCEDURE — 77062 BREAST TOMOSYNTHESIS BI: CPT | Mod: TC,PO

## 2019-11-05 PROCEDURE — 76642 ULTRASOUND BREAST LIMITED: CPT | Mod: 26,LT,, | Performed by: RADIOLOGY

## 2019-11-05 PROCEDURE — 77062 MAMMO DIGITAL DIAGNOSTIC BILAT WITH TOMOSYNTHESIS_CAD: ICD-10-PCS | Mod: 26,,, | Performed by: RADIOLOGY

## 2019-12-06 ENCOUNTER — TELEPHONE (OUTPATIENT)
Dept: INTERNAL MEDICINE | Facility: CLINIC | Age: 54
End: 2019-12-06

## 2019-12-06 NOTE — TELEPHONE ENCOUNTER
Called pt, she states that she lost the number to the [facility] And needs it to make an appt. Please call and advise.

## 2019-12-06 NOTE — TELEPHONE ENCOUNTER
----- Message from Clarisa Cadena sent at 12/6/2019  3:03 PM CST -----  Contact: pt  Please give pt a call @ [phone]

## 2020-03-05 ENCOUNTER — PATIENT MESSAGE (OUTPATIENT)
Dept: INTERNAL MEDICINE | Facility: CLINIC | Age: 55
End: 2020-03-05

## 2020-03-05 DIAGNOSIS — Z91.89 AT RISK FOR HEART DISEASE: Primary | ICD-10-CM

## 2020-03-10 NOTE — TELEPHONE ENCOUNTER
Hi, please contact the patient to assist in scheduling    Orders Placed This Encounter    Ambulatory referral/consult to Cardiology       Thank you, Dexter Cohen

## 2020-04-28 ENCOUNTER — TELEPHONE (OUTPATIENT)
Dept: OBSTETRICS AND GYNECOLOGY | Facility: CLINIC | Age: 55
End: 2020-04-28

## 2020-04-28 NOTE — TELEPHONE ENCOUNTER
----- Message from Ashleigh Aguayo sent at 4/28/2020  1:18 PM CDT -----  Contact: WEI SALAZAR    Type:  Patient Returning Call    Who Called: WEI SALAZAR     Who Left Message for Patient:DINORAHCALLUM    Does the patient know what this is regarding?yes    Best Call Back Number:[phone]    Additional Information:

## 2020-05-13 ENCOUNTER — LAB VISIT (OUTPATIENT)
Dept: PRIMARY CARE CLINIC | Facility: CLINIC | Age: 55
End: 2020-05-13
Payer: COMMERCIAL

## 2020-05-13 DIAGNOSIS — Z00.6 RESEARCH STUDY PATIENT: Primary | ICD-10-CM

## 2020-05-13 LAB — SARS-COV-2 IGG SERPLBLD QL IA.RAPID: NEGATIVE

## 2020-05-13 PROCEDURE — U0002 COVID-19 LAB TEST NON-CDC: HCPCS

## 2020-05-13 PROCEDURE — 86769 SARS-COV-2 COVID-19 ANTIBODY: CPT

## 2020-05-13 NOTE — RESEARCH
Date of Consent:05/13/2020    Sponsor: [facility]    Study Title/IRB Number: Observational study of Sars-CoV2 Immunoglobulin G (IgG) seroprevalence among the [facility] population over time 2020.163  Principle Investigator: Renuka Trimble, PhD    Did the patient need translation services? No   name: N/A    Prior to the Informed Consent (IC) being signed, or any study protocol required data collection, testing, procedure, or intervention being performed, the following was done and/or discussed:   Patient was given a paper copy of the IC for review    Patient was given study FAQ   Purpose of the study and qualifications to participate    Study design and tests or procedures done at this visit   Confidentiality and HIPAA Authorization for Release of Medical Records for the research trial/ subject's rights/research related injury   Risk, Benefits, Alternative Treatments, Compensation and Costs   Participation in the research trial is voluntary and patient may withdraw at anytime   Contact information for study related questions    Patient verbalizes understanding of the above: Yes  Contact information for PI and IRB given to patient: Yes  Patient able to adequately summarize: the purpose of the study, the risks associated with the study, and all procedures, testing, and follow-ups associated with the study: Yes    The consent was discussed verbally with the patient and all questions were answered satisfactorily. Patient gave verbal consent for the Seroprevalence research study with an IRB approval date of 05/08/2020.      The Consent, Consent Witness and name of Clinical Research Coordinator consenting was captured and documented in [employer].    All Inclusion and Exclusion Criteria reviewed, subject meets all Inclusion criteria and does not meet any Exclusion Criteria at this time.     Patient Eligibility was confirmed.    Patient responded to survey questions.    The following biospecimen  collection procedures were collected:    -Nasopharyngeal Swab Collection  -Blood collection

## 2020-05-14 LAB — SARS-COV-2 RNA RESP QL NAA+PROBE: NOT DETECTED

## 2020-05-18 ENCOUNTER — TELEPHONE (OUTPATIENT)
Dept: OBSTETRICS AND GYNECOLOGY | Facility: CLINIC | Age: 55
End: 2020-05-18

## 2020-05-18 NOTE — TELEPHONE ENCOUNTER
----- Message from Karan Temple sent at 5/18/2020 12:31 PM CDT -----  Contact: WEI SALAZAR [[id number]]  Name of Who is Calling: WEI SALAZAR [[id number]]      What is the request in detail: Would like to speak with staff in regards to reschedule her annual. Please advise    Can the clinic reply by [facility]: yes      [facility]: [phone]

## 2020-06-03 ENCOUNTER — TELEPHONE (OUTPATIENT)
Dept: INTERNAL MEDICINE | Facility: CLINIC | Age: 55
End: 2020-06-03

## 2020-06-03 DIAGNOSIS — Z12.31 ENCOUNTER FOR SCREENING MAMMOGRAM FOR MALIGNANT NEOPLASM OF BREAST: Primary | ICD-10-CM

## 2020-06-03 NOTE — TELEPHONE ENCOUNTER
Hi, please contact the patient to assist in scheduling    Orders Placed This Encounter    Mammo Digital Screening Bilat w/ Sea    US BREAST SCREENING BILATERAL     Also, please offer her a checkup appt with me since it has been over 1 year.    Thank you, Dexter Cohen

## 2020-06-03 NOTE — TELEPHONE ENCOUNTER
pt states that [facility] told her she's a high risk because she has dense breast so she needs an order in for every 6 months.

## 2020-06-03 NOTE — TELEPHONE ENCOUNTER
----- Message from Shelly Kowalski sent at 6/3/2020 11:45 AM CDT -----  Contact: pt @ [phone]  Asking to speak with someone in Dr. Cohen's office regarding instruction she was given by the Radiologist in November 2019 as to her needing to get a MRI and Ultrasound in May. Please advise.

## 2020-06-03 NOTE — TELEPHONE ENCOUNTER
Hi, please call her back --  She had a mammogram and ultrasound in November at [facility] and the radiologist recommended a repeat in 1 year, I do not think she needs a repeat now.    Let me know if patient has any questions.  Thank you, Dexter Cohen

## 2020-06-30 RX ORDER — FLUCONAZOLE 200 MG/1
200 TABLET ORAL
Qty: 2 TABLET | Refills: 0 | Status: SHIPPED | OUTPATIENT
Start: 2020-06-30

## 2020-10-01 ENCOUNTER — OFFICE VISIT (OUTPATIENT)
Dept: INTERNAL MEDICINE | Facility: CLINIC | Age: 55
End: 2020-10-01
Payer: COMMERCIAL

## 2020-10-01 VITALS
HEART RATE: 84 BPM | HEIGHT: 66 IN | SYSTOLIC BLOOD PRESSURE: 118 MMHG | WEIGHT: 134.06 LBS | BODY MASS INDEX: 21.55 KG/M2 | DIASTOLIC BLOOD PRESSURE: 72 MMHG | OXYGEN SATURATION: 99 %

## 2020-10-01 DIAGNOSIS — R92.30 DENSE BREAST TISSUE ON MAMMOGRAM: ICD-10-CM

## 2020-10-01 DIAGNOSIS — Z00.00 ROUTINE GENERAL MEDICAL EXAMINATION AT A HEALTH CARE FACILITY: Primary | ICD-10-CM

## 2020-10-01 DIAGNOSIS — E78.2 MIXED HYPERLIPIDEMIA: ICD-10-CM

## 2020-10-01 LAB
BACTERIA #/AREA URNS AUTO: ABNORMAL /HPF
BILIRUB UR QL STRIP: NEGATIVE
CLARITY UR REFRACT.AUTO: CLEAR
COLOR UR AUTO: YELLOW
GLUCOSE UR QL STRIP: NEGATIVE
GRAN CASTS UR QL COMP ASSIST: 1 /LPF
HGB UR QL STRIP: ABNORMAL
KETONES UR QL STRIP: NEGATIVE
LEUKOCYTE ESTERASE UR QL STRIP: NEGATIVE
MICROSCOPIC COMMENT: ABNORMAL
NITRITE UR QL STRIP: NEGATIVE
PH UR STRIP: 5 [PH] (ref 5–8)
PROT UR QL STRIP: NEGATIVE
RBC #/AREA URNS AUTO: 17 /HPF (ref 0–4)
SP GR UR STRIP: 1.02 (ref 1–1.03)
SQUAMOUS #/AREA URNS AUTO: 0 /HPF
URN SPEC COLLECT METH UR: ABNORMAL
WBC #/AREA URNS AUTO: 0 /HPF (ref 0–5)

## 2020-10-01 PROCEDURE — 99396 PREV VISIT EST AGE 40-64: CPT | Mod: S$GLB,,, | Performed by: INTERNAL MEDICINE

## 2020-10-01 PROCEDURE — 99396 PR PREVENTIVE VISIT,EST,40-64: ICD-10-PCS | Mod: S$GLB,,, | Performed by: INTERNAL MEDICINE

## 2020-10-01 PROCEDURE — 3008F PR BODY MASS INDEX (BMI) DOCUMENTED: ICD-10-PCS | Mod: CPTII,S$GLB,, | Performed by: INTERNAL MEDICINE

## 2020-10-01 PROCEDURE — 81001 URINALYSIS AUTO W/SCOPE: CPT

## 2020-10-01 PROCEDURE — 99999 PR PBB SHADOW E&M-EST. PATIENT-LVL V: CPT | Mod: PBBFAC,,, | Performed by: INTERNAL MEDICINE

## 2020-10-01 PROCEDURE — 3008F BODY MASS INDEX DOCD: CPT | Mod: CPTII,S$GLB,, | Performed by: INTERNAL MEDICINE

## 2020-10-01 PROCEDURE — 99999 PR PBB SHADOW E&M-EST. PATIENT-LVL V: ICD-10-PCS | Mod: PBBFAC,,, | Performed by: INTERNAL MEDICINE

## 2020-10-01 NOTE — PROGRESS NOTES
Subjective:       Patient ID: Willa Banegas is a 54 y.o. female.    Chief Complaint: Annual Exam    Here for annual exam    Would like lipids rechecked. Her levels change drastically based on her diet.    wondes abt next br ca screening test and whether she would benefit from mri.    Had cysto for hematuria and no concerning finding found. She denies any gross blood in urine/    Review of Systems   Constitutional: Negative for activity change and unexpected weight change.   HENT: Negative for hearing loss, rhinorrhea and trouble swallowing.    Eyes: Negative for discharge and visual disturbance.   Respiratory: Negative for chest tightness and wheezing.    Cardiovascular: Negative for chest pain and palpitations.   Gastrointestinal: Negative for blood in stool, constipation, diarrhea and vomiting.   Endocrine: Positive for polydipsia. Negative for polyuria.   Genitourinary: Negative for difficulty urinating, dysuria, hematuria and menstrual problem.   Musculoskeletal: Positive for arthralgias (mild knee pains, but still able to run). Negative for joint swelling and neck pain.   Neurological: Negative for weakness and headaches.   Psychiatric/Behavioral: Negative for confusion and dysphoric mood.           Objective:      Physical Exam  Constitutional:       Appearance: She is well-developed.   HENT:      Head: Normocephalic and atraumatic.   Eyes:      General: No scleral icterus.  Neck:      Musculoskeletal: Normal range of motion.      Thyroid: No thyromegaly.   Cardiovascular:      Rate and Rhythm: Normal rate and regular rhythm.      Heart sounds: Normal heart sounds. No murmur. No friction rub. No gallop.    Pulmonary:      Effort: Pulmonary effort is normal. No respiratory distress.      Breath sounds: Normal breath sounds. No wheezing or rales.   Abdominal:      General: Bowel sounds are normal. There is no distension.      Palpations: Abdomen is soft. There is no mass.      Tenderness: There is no  abdominal tenderness. There is no guarding or rebound.   Musculoskeletal: Normal range of motion.         General: No tenderness.   Lymphadenopathy:      Cervical: No cervical adenopathy.   Neurological:      Mental Status: She is alert and oriented to person, place, and time.   Psychiatric:         Speech: Speech normal.         Assessment:       1. Dense breast tissue on mammogram    2. Routine general medical examination at a health care facility    3. Mixed hyperlipidemia        Plan:       Willa was seen today for annual exam.    Diagnoses and all orders for this visit:    Dense breast tissue on mammogram  -     Ambulatory referral/consult to Breast Surgery; Future    Routine general medical examination at a health care facility  -     Urinalysis  -     CBC auto differential; Future  -     Comprehensive metabolic panel; Future  -     Lipid Panel; Future  -     COVID-19 (SARS CoV-2) IgG Antibody; Future  -     HIV 1/2 Ag/Ab (4th Gen); Future  -     Urinalysis Microscopic  -     CT Cardiac Scoring; Future    Mixed hyperlipidemia  -     CT Cardiac Scoring; Future  She is not sure if [employer] cardiologist recommended another test, she will check      Health Maintenance       Date Due Completion Date    Hepatitis C Screening 1965 ---    HIV Screening 11/04/1982 ---    TETANUS VACCINE 11/04/1985 ---    Shingles Vaccine (1 of 2) 11/04/2017 ---    Cervical Cancer Screening 07/22/2019 7/22/2016    Influenza Vaccine (1) 08/01/2020 10/2/2018    Mammogram 11/05/2020 11/5/2019    Lipid Panel 09/10/2024 9/10/2019    Colorectal Cancer Screening 10/20/2025 10/20/2015          Follow up in about 1 year (around 10/1/2021) for Flu and Shingles vaccine please.    Future Appointments   Date Time Provider Department Center   10/6/2020  2:15 PM [facility] CT1 64- LIMIT 650 LBS [facility] CT SCAN Shan Peters   10/8/2020  9:10 AM LAB, APPOINTMENT NOM INTMED [facility] LAB IM Shan Peters PCW   10/15/2020  1:00 PM J[facility] Truj[facility]  BRSTSUR Shan Peters

## 2020-10-02 ENCOUNTER — PATIENT MESSAGE (OUTPATIENT)
Dept: INTERNAL MEDICINE | Facility: CLINIC | Age: 55
End: 2020-10-02

## 2020-10-02 ENCOUNTER — TELEPHONE (OUTPATIENT)
Dept: INTERNAL MEDICINE | Facility: CLINIC | Age: 55
End: 2020-10-02

## 2020-10-02 NOTE — TELEPHONE ENCOUNTER
----- Message from Aria Banerjee sent at 10/2/2020 12:14 PM CDT -----  Contact: Pt [phone]  Patient is requesting a call about her test results.    Please call and advise.    Thank You

## 2020-10-02 NOTE — TELEPHONE ENCOUNTER
Hi, the urine test showed microscopic blood as she has had before.  Let me know if patient has any questions.  Thank you, Dexter Cohen

## 2020-10-03 ENCOUNTER — TELEPHONE (OUTPATIENT)
Dept: INTERNAL MEDICINE | Facility: CLINIC | Age: 55
End: 2020-10-03

## 2020-10-03 NOTE — TELEPHONE ENCOUNTER
Hi Dr Cohen,   I got an email about my test results from the urine sample, so I took a look. I expected a bit or red blood cells, but it seems there is something else abnormal- the granule thing, which of course has me rather anxious. Could we please go over the results? Thank you,   Willa Banegas      Please advise    Patient also stated that she can be reached on My chart also

## 2020-10-05 ENCOUNTER — PATIENT MESSAGE (OUTPATIENT)
Dept: ADMINISTRATIVE | Facility: HOSPITAL | Age: 55
End: 2020-10-05

## 2020-10-06 ENCOUNTER — HOSPITAL ENCOUNTER (OUTPATIENT)
Dept: RADIOLOGY | Facility: HOSPITAL | Age: 55
Discharge: HOME | End: 2020-10-06
Attending: INTERNAL MEDICINE
Payer: COMMERCIAL

## 2020-10-06 DIAGNOSIS — Z00.00 ROUTINE GENERAL MEDICAL EXAMINATION AT A HEALTH CARE FACILITY: ICD-10-CM

## 2020-10-06 DIAGNOSIS — E78.2 MIXED HYPERLIPIDEMIA: ICD-10-CM

## 2020-10-06 PROCEDURE — 75571 CT CALCIUM SCORING CARDIAC: ICD-10-PCS | Mod: 26,,, | Performed by: RADIOLOGY

## 2020-10-06 PROCEDURE — 75571 CT HRT W/O DYE W/CA TEST: CPT | Mod: TC

## 2020-10-06 PROCEDURE — 75571 CT HRT W/O DYE W/CA TEST: CPT | Mod: 26,,, | Performed by: RADIOLOGY

## 2020-10-13 ENCOUNTER — PATIENT OUTREACH (OUTPATIENT)
Dept: ADMINISTRATIVE | Facility: HOSPITAL | Age: 55
End: 2020-10-13

## 2020-10-13 NOTE — PROGRESS NOTES
Health Maintenance Due   Topic Date Due    Hepatitis C Screening  1965    HIV Screening  11/04/1982    Shingles Vaccine (1 of 2) 11/04/2017    Influenza Vaccine (1) 08/01/2020    Mammogram  11/05/2020     Updated cervcial cancer screening in .  Chart review completed.

## 2020-10-20 ENCOUNTER — OFFICE VISIT (OUTPATIENT)
Dept: INTERNAL MEDICINE | Facility: CLINIC | Age: 55
End: 2020-10-20
Payer: COMMERCIAL

## 2020-10-20 DIAGNOSIS — R43.9 PROBLEMS WITH SMELL AND TASTE: ICD-10-CM

## 2020-10-20 DIAGNOSIS — J34.89 RHINORRHEA: ICD-10-CM

## 2020-10-20 DIAGNOSIS — Z20.822 EXPOSURE TO COVID-19 VIRUS: Primary | ICD-10-CM

## 2020-10-20 DIAGNOSIS — R11.0 NAUSEA: ICD-10-CM

## 2020-10-20 PROCEDURE — 99213 PR OFFICE/OUTPT VISIT, EST, LEVL III, 20-29 MIN: ICD-10-PCS | Mod: 95,,, | Performed by: PHYSICIAN ASSISTANT

## 2020-10-20 PROCEDURE — 99213 OFFICE O/P EST LOW 20 MIN: CPT | Mod: 95,,, | Performed by: PHYSICIAN ASSISTANT

## 2020-10-20 NOTE — PROGRESS NOTES
Subjective:       Patient ID: Willa Banegas is a 54 y.o. female.    Chief Complaint: COVID-19 Concerns    HPI     Established pt of Dexter Cohen MD (new to me)    The patient location is: [place], [place]  The chief complaint leading to consultation is: COVID concerns    Visit type: audiovisual    Face to Face time with patient: 8 mins  12 minutes of total time spent on the encounter, which includes face to face time and non-face to face time preparing to see the patient (eg, review of tests), Obtaining and/or reviewing separately obtained history, Documenting clinical information in the electronic or other health record, Independently interpreting results (not separately reported) and communicating results to the patient/family/caregiver, or Care coordination (not separately reported).         Each patient to whom he or she provides medical services by telemedicine is:  (1) informed of the relationship between the physician and patient and the respective role of any other health care provider with respect to management of the patient; and (2) notified that he or she may decline to receive medical services by telemedicine and may withdraw from such care at any time.    Notes:       Pt son's classmate tested positive for COVID last week. Pt' sons was in close contact. Her son is having COVID symptoms and going to be tested today. She notes nasal congestion sore throat for the past 3 days. Some mild smell changes. No fevers, cp, sob or cough.     Answers for HPI/ROS submitted by the patient on 10/20/2020   Sore throat  Chronicity: new  Onset: yesterday  Progression since onset: gradually worsening  Pain worse on: neither  Fever: no fever  Pain - numeric: 6/10  congestion: Yes  plugged ear sensation: Yes  strep: No  mono: No  Treatments tried: nothing  Pain severity: mild      Review of Systems   Constitutional: Negative for chills, fatigue and fever.   HENT: Positive for nasal congestion and sore throat.     Respiratory: Negative for cough, shortness of breath and wheezing.    Gastrointestinal: Negative for abdominal pain, diarrhea, nausea and vomiting.         Objective:       Physical Exam  Constitutional:       General: She is not in acute distress.     Appearance: Normal appearance. She is not ill-appearing.   Pulmonary:      Effort: Pulmonary effort is normal. No respiratory distress.      Comments: Speaking comfortably in complete sentences  Neurological:      Mental Status: She is alert.         Assessment:       1. Exposure to COVID-19 virus    2. Rhinorrhea    3. Nausea    4. Problems with smell and taste        Plan:         Willa was seen today for covid-19 concerns.    Diagnoses and all orders for this visit:    Exposure to COVID-19 virus  -     COVID-19 Routine Screening; Future    Rhinorrhea  -     COVID-19 Routine Screening; Future    Nausea  -     COVID-19 Routine Screening; Future    Problems with smell and taste  -     COVID-19 Routine Screening; Future      Advised on home quarantine   Symptomatic care discussed   COVID testing ordered  Monitor symptoms closely  Notify clinic of any worsening symptoms or concerns    Shannon Vazquez PA-C

## 2020-10-21 ENCOUNTER — LAB VISIT (OUTPATIENT)
Dept: INTERNAL MEDICINE | Facility: CLINIC | Age: 55
End: 2020-10-21
Payer: COMMERCIAL

## 2020-10-21 DIAGNOSIS — R43.9 PROBLEMS WITH SMELL AND TASTE: ICD-10-CM

## 2020-10-21 DIAGNOSIS — Z20.822 EXPOSURE TO COVID-19 VIRUS: ICD-10-CM

## 2020-10-21 DIAGNOSIS — J34.89 RHINORRHEA: ICD-10-CM

## 2020-10-21 DIAGNOSIS — R11.0 NAUSEA: ICD-10-CM

## 2020-10-21 PROCEDURE — U0003 INFECTIOUS AGENT DETECTION BY NUCLEIC ACID (DNA OR RNA); SEVERE ACUTE RESPIRATORY SYNDROME CORONAVIRUS 2 (SARS-COV-2) (CORONAVIRUS DISEASE [COVID-19]), AMPLIFIED PROBE TECHNIQUE, MAKING USE OF HIGH THROUGHPUT TECHNOLOGIES AS DESCRIBED BY CMS-2020-01-R: HCPCS

## 2020-10-22 LAB — SARS-COV-2 RNA RESP QL NAA+PROBE: NOT DETECTED

## 2020-11-09 ENCOUNTER — PATIENT OUTREACH (OUTPATIENT)
Dept: ADMINISTRATIVE | Facility: OTHER | Age: 55
End: 2020-11-09

## 2020-11-09 NOTE — PROGRESS NOTES
LINKS immunization registry not responding  Care Everywhere updated  Health Maintenance updated  DIS/Chart reviewed for overdue Proactive [facility] Encounters (ALESSANDRO) health maintenance testing (CRS, Breast Ca, Diabetic Eye Exam)   Orders entered:N/A

## 2020-12-03 ENCOUNTER — OFFICE VISIT (OUTPATIENT)
Dept: HEMATOLOGY/ONCOLOGY | Facility: CLINIC | Age: 55
End: 2020-12-03
Payer: COMMERCIAL

## 2020-12-03 ENCOUNTER — LAB VISIT (OUTPATIENT)
Dept: LAB | Facility: HOSPITAL | Age: 55
End: 2020-12-03
Attending: INTERNAL MEDICINE
Payer: COMMERCIAL

## 2020-12-03 ENCOUNTER — TELEPHONE (OUTPATIENT)
Dept: HEMATOLOGY/ONCOLOGY | Facility: CLINIC | Age: 55
End: 2020-12-03

## 2020-12-03 ENCOUNTER — IMMUNIZATION (OUTPATIENT)
Dept: PHARMACY | Facility: CLINIC | Age: 55
End: 2020-12-03
Payer: COMMERCIAL

## 2020-12-03 ENCOUNTER — PATIENT MESSAGE (OUTPATIENT)
Dept: HEMATOLOGY/ONCOLOGY | Facility: CLINIC | Age: 55
End: 2020-12-03

## 2020-12-03 VITALS
OXYGEN SATURATION: 95 % | HEIGHT: 66 IN | TEMPERATURE: 98 F | SYSTOLIC BLOOD PRESSURE: 133 MMHG | HEART RATE: 77 BPM | WEIGHT: 141.44 LBS | DIASTOLIC BLOOD PRESSURE: 58 MMHG | BODY MASS INDEX: 22.73 KG/M2 | RESPIRATION RATE: 18 BRPM

## 2020-12-03 DIAGNOSIS — Z00.00 ROUTINE GENERAL MEDICAL EXAMINATION AT A HEALTH CARE FACILITY: ICD-10-CM

## 2020-12-03 DIAGNOSIS — R92.30 DENSE BREAST TISSUE ON MAMMOGRAM: ICD-10-CM

## 2020-12-03 DIAGNOSIS — Z91.89 INCREASED RISK OF BREAST CANCER: ICD-10-CM

## 2020-12-03 LAB
ALBUMIN SERPL BCP-MCNC: 4.3 G/DL (ref 3.5–5.2)
ALP SERPL-CCNC: 74 U/L (ref 55–135)
ALT SERPL W/O P-5'-P-CCNC: 17 U/L (ref 10–44)
ANION GAP SERPL CALC-SCNC: 10 MMOL/L (ref 8–16)
AST SERPL-CCNC: 23 U/L (ref 10–40)
BASOPHILS # BLD AUTO: 0.08 K/UL (ref 0–0.2)
BASOPHILS NFR BLD: 1 % (ref 0–1.9)
BILIRUB SERPL-MCNC: 0.4 MG/DL (ref 0.1–1)
BUN SERPL-MCNC: 18 MG/DL (ref 6–20)
CALCIUM SERPL-MCNC: 9.6 MG/DL (ref 8.7–10.5)
CHLORIDE SERPL-SCNC: 104 MMOL/L (ref 95–110)
CO2 SERPL-SCNC: 28 MMOL/L (ref 23–29)
CREAT SERPL-MCNC: 0.9 MG/DL (ref 0.5–1.4)
DIFFERENTIAL METHOD: ABNORMAL
EOSINOPHIL # BLD AUTO: 0.5 K/UL (ref 0–0.5)
EOSINOPHIL NFR BLD: 5.9 % (ref 0–8)
ERYTHROCYTE [DISTWIDTH] IN BLOOD BY AUTOMATED COUNT: 12.4 % (ref 11.5–14.5)
EST. GFR  (AFRICAN AMERICAN): >60 ML/MIN/1.73 M^2
EST. GFR  (NON AFRICAN AMERICAN): >60 ML/MIN/1.73 M^2
GLUCOSE SERPL-MCNC: 92 MG/DL (ref 70–110)
HCT VFR BLD AUTO: 41.3 % (ref 37–48.5)
HGB BLD-MCNC: 13 G/DL (ref 12–16)
IMM GRANULOCYTES # BLD AUTO: 0.03 K/UL (ref 0–0.04)
IMM GRANULOCYTES NFR BLD AUTO: 0.4 % (ref 0–0.5)
LYMPHOCYTES # BLD AUTO: 2.7 K/UL (ref 1–4.8)
LYMPHOCYTES NFR BLD: 33 % (ref 18–48)
MCH RBC QN AUTO: 29.9 PG (ref 27–31)
MCHC RBC AUTO-ENTMCNC: 31.5 G/DL (ref 32–36)
MCV RBC AUTO: 95 FL (ref 82–98)
MONOCYTES # BLD AUTO: 0.4 K/UL (ref 0.3–1)
MONOCYTES NFR BLD: 4.8 % (ref 4–15)
NEUTROPHILS # BLD AUTO: 4.4 K/UL (ref 1.8–7.7)
NEUTROPHILS NFR BLD: 54.9 % (ref 38–73)
NRBC BLD-RTO: 0 /100 WBC
PLATELET # BLD AUTO: 345 K/UL (ref 150–350)
PMV BLD AUTO: 10.8 FL (ref 9.2–12.9)
POTASSIUM SERPL-SCNC: 5.1 MMOL/L (ref 3.5–5.1)
PROT SERPL-MCNC: 7.4 G/DL (ref 6–8.4)
RBC # BLD AUTO: 4.35 M/UL (ref 4–5.4)
SODIUM SERPL-SCNC: 142 MMOL/L (ref 136–145)
TSH SERPL DL<=0.005 MIU/L-ACNC: 1.84 UIU/ML (ref 0.4–4)
TSH SERPL DL<=0.005 MIU/L-ACNC: 1.84 UIU/ML (ref 0.4–4)
WBC # BLD AUTO: 8.07 K/UL (ref 3.9–12.7)

## 2020-12-03 PROCEDURE — 99999 PR PBB SHADOW E&M-EST. PATIENT-LVL IV: ICD-10-PCS | Mod: PBBFAC,,, | Performed by: INTERNAL MEDICINE

## 2020-12-03 PROCEDURE — 84443 ASSAY THYROID STIM HORMONE: CPT

## 2020-12-03 PROCEDURE — 99203 OFFICE O/P NEW LOW 30 MIN: CPT | Mod: S$GLB,,, | Performed by: INTERNAL MEDICINE

## 2020-12-03 PROCEDURE — 85025 COMPLETE CBC W/AUTO DIFF WBC: CPT

## 2020-12-03 PROCEDURE — 1126F AMNT PAIN NOTED NONE PRSNT: CPT | Mod: S$GLB,,, | Performed by: INTERNAL MEDICINE

## 2020-12-03 PROCEDURE — 99203 PR OFFICE/OUTPT VISIT, NEW, LEVL III, 30-44 MIN: ICD-10-PCS | Mod: S$GLB,,, | Performed by: INTERNAL MEDICINE

## 2020-12-03 PROCEDURE — 99999 PR PBB SHADOW E&M-EST. PATIENT-LVL IV: CPT | Mod: PBBFAC,,, | Performed by: INTERNAL MEDICINE

## 2020-12-03 PROCEDURE — 36415 COLL VENOUS BLD VENIPUNCTURE: CPT

## 2020-12-03 PROCEDURE — 3008F BODY MASS INDEX DOCD: CPT | Mod: CPTII,S$GLB,, | Performed by: INTERNAL MEDICINE

## 2020-12-03 PROCEDURE — 80053 COMPREHEN METABOLIC PANEL: CPT

## 2020-12-03 PROCEDURE — 3008F PR BODY MASS INDEX (BMI) DOCUMENTED: ICD-10-PCS | Mod: CPTII,S$GLB,, | Performed by: INTERNAL MEDICINE

## 2020-12-03 PROCEDURE — 1126F PR PAIN SEVERITY QUANTIFIED, NO PAIN PRESENT: ICD-10-PCS | Mod: S$GLB,,, | Performed by: INTERNAL MEDICINE

## 2020-12-03 NOTE — LETTER
December 3, 2020      Dexter Cohen MD  [address]  [facility] [address]           [facility]  [address]  [facility] [address]  Phone: [phone]  Fax: [phone]          Patient: Willa Banegas   MR Number: [id number]   YOB: 1965   Date of Visit: 12/3/2020       Dear Dr. Dexter Cohen:    Thank you for referring Willa Banegas to me for evaluation. Attached you will find relevant portions of my assessment and plan of care.    If you have questions, please do not hesitate to call me. I look forward to following Willa Banegas along with you.    Sincerely,    Raul Kauffman MD    Enclosure  CC:  No Recipients    If you would like to receive this communication electronically, please contact [email or web address] or [phone] to request more information on [employer] Link access.    For providers and/or their staff who would like to refer a patient to [facility], please contact us through our one-stop-shop provider referral line, [facility], at [phone].    If you feel you have received this communication in error or would no longer like to receive these types of communications, please e-mail [email or web address]

## 2020-12-03 NOTE — PROGRESS NOTES
Subjective:       Patient ID: Willa Banegas is a 55 y.o. female.    Chief Complaint: No chief complaint on file.    HPI 55-year-old female referred for evaluation of increased risk of breast cancer.    Screening mammogram from 2019 showed heterogeneously dense breasts with focused ultrasound showing no abnormalities.  Tyrer- Apolonia score showed 28.5% lifetime risk of breast cancer.  She is referred for discussion regarding optimal management.    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    PMH: colon polyps, basal cell carcinoma, atypical mole with questionable stage I melanoma    Review of Systems   Constitutional: Negative for appetite change and unexpected weight change.   HENT: Negative for mouth sores.    Eyes: Negative for visual disturbance.   Respiratory: Negative for cough and shortness of breath.    Cardiovascular: Negative for chest pain.   Gastrointestinal: Negative for abdominal pain and diarrhea.   Genitourinary: Negative for frequency.   Musculoskeletal: Negative for back pain.   Integumentary:  Negative for rash.   Neurological: Negative for headaches.   Hematological: Negative for adenopathy.   Psychiatric/Behavioral: The patient is not nervous/anxious.          Objective:      Physical Exam  Constitutional:       General: She is not in acute distress.     Appearance: Normal appearance.   Cardiovascular:      Rate and Rhythm: Normal rate and regular rhythm.    Pulmonary:      Effort: Pulmonary effort is normal. No respiratory distress.      Breath sounds: Normal breath sounds. No wheezing or rales.   Chest:      Breasts:         Right: Normal. No bleeding, inverted nipple, mass, nipple discharge, skin change or tenderness.         Left: Normal. No bleeding, inverted nipple, mass, nipple discharge, skin change or tenderness.   Abdominal:      Palpations: There is no mass.   Neurological:      Mental Status: She is alert and oriented to person, place, and time.   Psychiatric:         Mood and Affect: Mood normal.         Behavior: Behavior normal.         Thought Content: Thought content normal.         Judgment: Judgment normal.         Assessment:       1. Increased risk of breast cancer        Plan:       I reviewed the risk of recurrence based on her most recent mammography score and breast cancer risk assessment tool both with TC and Jemma Model.    I discussed the role of screening MRI. The patient will have her annual mammogram, then we will evaluate breast density to assess whether breast MRI is needed in 6 months.  I also discussed the option for chemoprevention with tamoxifen or an aromatase inhibitor.  We discussed risks and benefits. Patient would prefer to forgo this at this time.

## 2020-12-03 NOTE — TELEPHONE ENCOUNTER
Called patient left message about scheduling the mammogram. Number was provided.        ----- Message from Raul Kauffman MD sent at 12/3/2020  1:01 PM CST -----  Set up mammo - next avail

## 2020-12-07 ENCOUNTER — HOSPITAL ENCOUNTER (OUTPATIENT)
Dept: RADIOLOGY | Facility: HOSPITAL | Age: 55
Discharge: HOME | End: 2020-12-07
Attending: INTERNAL MEDICINE
Payer: COMMERCIAL

## 2020-12-07 DIAGNOSIS — R92.30 DENSE BREAST TISSUE ON MAMMOGRAM: ICD-10-CM

## 2020-12-07 DIAGNOSIS — Z12.31 ENCOUNTER FOR SCREENING MAMMOGRAM FOR HIGH-RISK PATIENT: ICD-10-CM

## 2020-12-07 DIAGNOSIS — Z91.89 INCREASED RISK OF BREAST CANCER: ICD-10-CM

## 2020-12-07 PROCEDURE — 77067 SCR MAMMO BI INCL CAD: CPT | Mod: 26,,, | Performed by: RADIOLOGY

## 2020-12-07 PROCEDURE — 77063 MAMMO DIGITAL SCREENING BILAT WITH TOMO: ICD-10-PCS | Mod: 26,,, | Performed by: RADIOLOGY

## 2020-12-07 PROCEDURE — 77067 SCR MAMMO BI INCL CAD: CPT | Mod: TC

## 2020-12-07 PROCEDURE — 77063 BREAST TOMOSYNTHESIS BI: CPT | Mod: 26,,, | Performed by: RADIOLOGY

## 2020-12-07 PROCEDURE — 77067 MAMMO DIGITAL SCREENING BILAT WITH TOMO: ICD-10-PCS | Mod: 26,,, | Performed by: RADIOLOGY

## 2020-12-10 ENCOUNTER — TELEPHONE (OUTPATIENT)
Dept: HEMATOLOGY/ONCOLOGY | Facility: CLINIC | Age: 55
End: 2020-12-10

## 2020-12-10 DIAGNOSIS — Z91.89 INCREASED RISK OF BREAST CANCER: Primary | ICD-10-CM

## 2020-12-10 NOTE — TELEPHONE ENCOUNTER
Message fwd to .       ----- Message from Raul Kauffman MD sent at 12/10/2020  8:57 AM CST -----  Breast MRI and se me 6 M

## 2020-12-11 ENCOUNTER — TELEPHONE (OUTPATIENT)
Dept: INTERNAL MEDICINE | Facility: CLINIC | Age: 55
End: 2020-12-11

## 2020-12-11 ENCOUNTER — TELEPHONE (OUTPATIENT)
Dept: HEMATOLOGY/ONCOLOGY | Facility: CLINIC | Age: 55
End: 2020-12-11

## 2020-12-11 ENCOUNTER — PATIENT MESSAGE (OUTPATIENT)
Dept: INTERNAL MEDICINE | Facility: CLINIC | Age: 55
End: 2020-12-11

## 2020-12-11 DIAGNOSIS — Z03.818 ENCOUNTER FOR OBSERVATION FOR SUSPECTED EXPOSURE TO OTHER BIOLOGICAL AGENTS RULED OUT: ICD-10-CM

## 2020-12-11 NOTE — TELEPHONE ENCOUNTER
Message fwd to NP.    ----- Message from Luiz Tompkins sent at 12/11/2020  3:19 PM CST -----  Patient called requesting to speak w/ someone regarding having an order submitted for scheduling for Covid testing for a procedure that she's having w/ a provider in [place], requesting callback to discuss the matter [phone]

## 2020-12-11 NOTE — TELEPHONE ENCOUNTER
----- Message from Eros Orona sent at 12/11/2020  3:28 PM CST -----  Regarding: self   Patient called in regards to getting a covid test order for a procedure she will be having done  to have a mole removed on 12/15/20 in [place] patient will need the test done after 1:30 tomorrow if possible she would like a call back from the the nurse to explain more in detail please advise

## 2020-12-12 ENCOUNTER — LAB VISIT (OUTPATIENT)
Dept: INTERNAL MEDICINE | Facility: CLINIC | Age: 55
End: 2020-12-12
Payer: COMMERCIAL

## 2020-12-12 DIAGNOSIS — Z03.818 ENCOUNTER FOR OBSERVATION FOR SUSPECTED EXPOSURE TO OTHER BIOLOGICAL AGENTS RULED OUT: ICD-10-CM

## 2020-12-12 PROCEDURE — U0003 INFECTIOUS AGENT DETECTION BY NUCLEIC ACID (DNA OR RNA); SEVERE ACUTE RESPIRATORY SYNDROME CORONAVIRUS 2 (SARS-COV-2) (CORONAVIRUS DISEASE [COVID-19]), AMPLIFIED PROBE TECHNIQUE, MAKING USE OF HIGH THROUGHPUT TECHNOLOGIES AS DESCRIBED BY CMS-2020-01-R: HCPCS

## 2020-12-13 LAB — SARS-COV-2 RNA RESP QL NAA+PROBE: NOT DETECTED

## 2020-12-26 ENCOUNTER — PATIENT MESSAGE (OUTPATIENT)
Dept: INTERNAL MEDICINE | Facility: CLINIC | Age: 55
End: 2020-12-26

## 2020-12-29 NOTE — TELEPHONE ENCOUNTER
Don't send these messages before trying to schedule a urgent  care appointment .  try to get them an urgent care visit please .

## 2021-01-19 ENCOUNTER — PATIENT MESSAGE (OUTPATIENT)
Dept: INTERNAL MEDICINE | Facility: CLINIC | Age: 56
End: 2021-01-19

## 2021-04-16 ENCOUNTER — PATIENT MESSAGE (OUTPATIENT)
Dept: RESEARCH | Facility: HOSPITAL | Age: 56
End: 2021-04-16

## 2021-06-07 ENCOUNTER — TELEPHONE (OUTPATIENT)
Dept: HEMATOLOGY/ONCOLOGY | Facility: CLINIC | Age: 56
End: 2021-06-07

## 2021-08-05 ENCOUNTER — PATIENT MESSAGE (OUTPATIENT)
Dept: INTERNAL MEDICINE | Facility: CLINIC | Age: 56
End: 2021-08-05

## 2022-03-16 ENCOUNTER — PATIENT MESSAGE (OUTPATIENT)
Dept: ADMINISTRATIVE | Facility: HOSPITAL | Age: 57
End: 2022-03-16
Payer: COMMERCIAL

## 2022-04-01 ENCOUNTER — PATIENT MESSAGE (OUTPATIENT)
Dept: ADMINISTRATIVE | Facility: HOSPITAL | Age: 57
End: 2022-04-01
Payer: COMMERCIAL

## 2022-04-04 ENCOUNTER — PATIENT OUTREACH (OUTPATIENT)
Dept: ADMINISTRATIVE | Facility: HOSPITAL | Age: 57
End: 2022-04-04
Payer: COMMERCIAL

## 2022-04-04 NOTE — PROGRESS NOTES
Health Maintenance Due   Topic Date Due    Hepatitis C Screening  Never done    HIV Screening  Never done    COVID-19 Vaccine (3 - Booster for Moderna series) 08/10/2021    Mammogram  12/07/2021     Triggered LINKS & reconciled immunizations. Updated Care Everywhere. Checked for outside lab results in [facility]. Record request has been sent to Dr. Greta Weston for a copy of pt's most recent mammography results. Chart review completed.

## 2022-04-04 NOTE — LETTER
AUTHORIZATION FOR RELEASE OF   CONFIDENTIAL INFORMATION    Dear Dr. Greta Weston,    We are seeing Willa Banegas, date of birth 1965, in the clinic at [facility]. Dexter Cohen MD is the patient's PCP. Willa Banegas has an outstanding lab/procedure at the time we reviewed her chart. In order to help keep her health information updated, she has authorized us to request the following medical record(s):        ( X )  MAMMOGRAM                                    (  )  COLONOSCOPY      (  )  PAP SMEAR                                          (  )  OUTSIDE LAB RESULTS     (  )  DEXA SCAN                                          (  )  EYE EXAM            (  )  FOOT EXAM                                          (  )  ENTIRE RECORD     (  )  OUTSIDE IMMUNIZATIONS                 (  )  _______________         Please fax records to [facility], Evan L Dvorin, MD, [phone]     If you have any questions, please contact KAR Mcgee at [phone].           Patient Name: Willa Banegas  : 1965  Patient Phone #: [phone]

## 2022-05-16 ENCOUNTER — TELEPHONE (OUTPATIENT)
Dept: HEMATOLOGY/ONCOLOGY | Facility: CLINIC | Age: 57
End: 2022-05-16
Payer: COMMERCIAL

## 2022-05-16 NOTE — TELEPHONE ENCOUNTER
"----- Message from Cici Phillip sent at 5/16/2022  9:39 AM CDT -----  Regarding: Medical Records Request  Name Of Caller: self    Contact Preference?: [phone]    What is the nature of the call?: pt is requesting medical records be sent to [facility] [address] [place] [address] [phone]              Additional Notes:  "Thank you for all that you do for our patients'"     "

## 2022-05-17 ENCOUNTER — PATIENT OUTREACH (OUTPATIENT)
Dept: ADMINISTRATIVE | Facility: HOSPITAL | Age: 57
End: 2022-05-17
Payer: COMMERCIAL

## 2022-05-17 NOTE — LETTER
AUTHORIZATION FOR RELEASE OF   CONFIDENTIAL INFORMATION    Dear Dr. Berto Field,    We are seeing Willa Banegas, date of birth 1965, in the clinic at [facility]. Dexter Cohen MD is the patient's PCP. Willa Banegas has an outstanding lab/procedure at the time we reviewed her chart. In order to help keep her health information updated, she has authorized us to request the following medical record(s):        ( X )  MAMMOGRAM                                    (  )  COLONOSCOPY      (  )  PAP SMEAR                                          (  )  OUTSIDE LAB RESULTS     (  )  DEXA SCAN                                          (  )  EYE EXAM            (  )  FOOT EXAM                                          (  )  ENTIRE RECORD     (  )  OUTSIDE IMMUNIZATIONS                 (  )  _______________         Please fax records to [facility], Evan L Dvorin, MD, [phone]     If you have any questions, please contact KAR Mcgee at [phone].           Patient Name: Willa Banegas  : 1965  Patient Phone #: [phone]

## 2022-11-10 NOTE — TELEPHONE ENCOUNTER
----- Message from Keisha Rogel sent at 10/2/2018 11:55 AM CDT -----  Contact: [phone]  Patient is requesting if the doctor can put in a order for her shingle vaccine.    Please advise, thank you   normal performance

## 2022-12-26 NOTE — TELEPHONE ENCOUNTER
----- Message from Liliya Mcmahon sent at 10/24/2017 11:08 AM CDT -----  Contact: pt   GILBERT-pt- pt is calling to speak with the nurse pt was seen yesterday pt is asking if Dr Reese got in touch with anyone from Podiatry pt is being referred by Dr Reese pt said Dr Reese couldn't do what she needed to do with the pts nail pt is asking where she is with the appt. Can you please call pt at [phone]    WILVER    
no
